# Patient Record
Sex: FEMALE | Race: WHITE | NOT HISPANIC OR LATINO | URBAN - METROPOLITAN AREA
[De-identification: names, ages, dates, MRNs, and addresses within clinical notes are randomized per-mention and may not be internally consistent; named-entity substitution may affect disease eponyms.]

---

## 2018-01-23 ENCOUNTER — INPATIENT (INPATIENT)
Facility: HOSPITAL | Age: 83
LOS: 5 days | Discharge: HOME | DRG: 871 | End: 2018-01-29
Attending: HOSPITALIST | Admitting: HOSPITALIST
Payer: MEDICARE

## 2018-01-23 VITALS
HEART RATE: 116 BPM | WEIGHT: 128.09 LBS | RESPIRATION RATE: 28 BRPM | TEMPERATURE: 100 F | DIASTOLIC BLOOD PRESSURE: 64 MMHG | SYSTOLIC BLOOD PRESSURE: 111 MMHG

## 2018-01-23 DIAGNOSIS — I10 ESSENTIAL (PRIMARY) HYPERTENSION: ICD-10-CM

## 2018-01-23 DIAGNOSIS — A41.9 SEPSIS, UNSPECIFIED ORGANISM: ICD-10-CM

## 2018-01-23 DIAGNOSIS — J18.1 LOBAR PNEUMONIA, UNSPECIFIED ORGANISM: ICD-10-CM

## 2018-01-23 DIAGNOSIS — Z29.9 ENCOUNTER FOR PROPHYLACTIC MEASURES, UNSPECIFIED: ICD-10-CM

## 2018-01-23 LAB
ALBUMIN SERPL ELPH-MCNC: 3.1 G/DL — LOW (ref 3.5–5)
ALP SERPL-CCNC: 83 U/L — SIGNIFICANT CHANGE UP (ref 40–120)
ALT FLD-CCNC: 15 U/L DA — SIGNIFICANT CHANGE UP (ref 10–60)
ANION GAP SERPL CALC-SCNC: 7 MMOL/L — SIGNIFICANT CHANGE UP (ref 5–17)
APPEARANCE UR: ABNORMAL
APTT BLD: 36 SEC — SIGNIFICANT CHANGE UP (ref 27.5–37.4)
AST SERPL-CCNC: 7 U/L — LOW (ref 10–40)
BASE EXCESS BLDV CALC-SCNC: 3.7 MMOL/L — HIGH (ref -2–2)
BILIRUB SERPL-MCNC: 1 MG/DL — SIGNIFICANT CHANGE UP (ref 0.2–1.2)
BILIRUB UR-MCNC: NEGATIVE — SIGNIFICANT CHANGE UP
BLOOD GAS COMMENTS, VENOUS: SIGNIFICANT CHANGE UP
BUN SERPL-MCNC: 19 MG/DL — HIGH (ref 7–18)
CALCIUM SERPL-MCNC: 9 MG/DL — SIGNIFICANT CHANGE UP (ref 8.4–10.5)
CHLORIDE SERPL-SCNC: 101 MMOL/L — SIGNIFICANT CHANGE UP (ref 96–108)
CO2 SERPL-SCNC: 28 MMOL/L — SIGNIFICANT CHANGE UP (ref 22–31)
COLOR SPEC: YELLOW — SIGNIFICANT CHANGE UP
CREAT SERPL-MCNC: 0.93 MG/DL — SIGNIFICANT CHANGE UP (ref 0.5–1.3)
DIFF PNL FLD: NEGATIVE — SIGNIFICANT CHANGE UP
GLUCOSE SERPL-MCNC: 162 MG/DL — HIGH (ref 70–99)
GLUCOSE UR QL: NEGATIVE — SIGNIFICANT CHANGE UP
HCO3 BLDV-SCNC: 29 MMOL/L — SIGNIFICANT CHANGE UP (ref 21–29)
HCT VFR BLD CALC: 38.5 % — SIGNIFICANT CHANGE UP (ref 34.5–45)
HGB BLD-MCNC: 11.6 G/DL — SIGNIFICANT CHANGE UP (ref 11.5–15.5)
HOROWITZ INDEX BLDV+IHG-RTO: 21 — SIGNIFICANT CHANGE UP
INR BLD: 1.42 RATIO — HIGH (ref 0.88–1.16)
KETONES UR-MCNC: ABNORMAL
LACTATE SERPL-SCNC: 1.2 MMOL/L — SIGNIFICANT CHANGE UP (ref 0.7–2)
LEUKOCYTE ESTERASE UR-ACNC: ABNORMAL
LYMPHOCYTES # BLD AUTO: 10 % — LOW (ref 13–44)
MCHC RBC-ENTMCNC: 26.9 PG — LOW (ref 27–34)
MCHC RBC-ENTMCNC: 30.3 GM/DL — LOW (ref 32–36)
MCV RBC AUTO: 88.8 FL — SIGNIFICANT CHANGE UP (ref 80–100)
MONOCYTES NFR BLD AUTO: 3 % — SIGNIFICANT CHANGE UP (ref 2–14)
NEUTROPHILS NFR BLD AUTO: 68 % — SIGNIFICANT CHANGE UP (ref 43–77)
NITRITE UR-MCNC: NEGATIVE — SIGNIFICANT CHANGE UP
PCO2 BLDV: 50 MMHG — SIGNIFICANT CHANGE UP (ref 35–50)
PH BLDV: 7.39 — SIGNIFICANT CHANGE UP (ref 7.35–7.45)
PH UR: 7 — SIGNIFICANT CHANGE UP (ref 5–8)
PLATELET # BLD AUTO: 300 K/UL — SIGNIFICANT CHANGE UP (ref 150–400)
PO2 BLDV: <44 MMHG — SIGNIFICANT CHANGE UP (ref 25–45)
POTASSIUM SERPL-MCNC: 3.6 MMOL/L — SIGNIFICANT CHANGE UP (ref 3.5–5.3)
POTASSIUM SERPL-SCNC: 3.6 MMOL/L — SIGNIFICANT CHANGE UP (ref 3.5–5.3)
PROT SERPL-MCNC: 7.2 G/DL — SIGNIFICANT CHANGE UP (ref 6–8.3)
PROT UR-MCNC: 15
PROTHROM AB SERPL-ACNC: 15.6 SEC — HIGH (ref 9.8–12.7)
RAPID RVP RESULT: SIGNIFICANT CHANGE UP
RBC # BLD: 4.34 M/UL — SIGNIFICANT CHANGE UP (ref 3.8–5.2)
RBC # FLD: 18.7 % — HIGH (ref 10.3–14.5)
SAO2 % BLDV: 50 % — LOW (ref 67–88)
SODIUM SERPL-SCNC: 136 MMOL/L — SIGNIFICANT CHANGE UP (ref 135–145)
SP GR SPEC: 1.01 — SIGNIFICANT CHANGE UP (ref 1.01–1.02)
UROBILINOGEN FLD QL: NEGATIVE — SIGNIFICANT CHANGE UP
WBC # BLD: 8.1 K/UL — SIGNIFICANT CHANGE UP (ref 3.8–10.5)
WBC # FLD AUTO: 8.1 K/UL — SIGNIFICANT CHANGE UP (ref 3.8–10.5)

## 2018-01-23 PROCEDURE — 99285 EMERGENCY DEPT VISIT HI MDM: CPT

## 2018-01-23 PROCEDURE — 99223 1ST HOSP IP/OBS HIGH 75: CPT | Mod: GC

## 2018-01-23 PROCEDURE — 71045 X-RAY EXAM CHEST 1 VIEW: CPT | Mod: 26

## 2018-01-23 RX ORDER — SODIUM CHLORIDE 9 MG/ML
3 INJECTION INTRAMUSCULAR; INTRAVENOUS; SUBCUTANEOUS ONCE
Qty: 0 | Refills: 0 | Status: COMPLETED | OUTPATIENT
Start: 2018-01-23 | End: 2018-01-23

## 2018-01-23 RX ORDER — ASPIRIN/CALCIUM CARB/MAGNESIUM 324 MG
81 TABLET ORAL DAILY
Qty: 0 | Refills: 0 | Status: DISCONTINUED | OUTPATIENT
Start: 2018-01-23 | End: 2018-01-29

## 2018-01-23 RX ORDER — ENOXAPARIN SODIUM 100 MG/ML
40 INJECTION SUBCUTANEOUS DAILY
Qty: 0 | Refills: 0 | Status: DISCONTINUED | OUTPATIENT
Start: 2018-01-23 | End: 2018-01-29

## 2018-01-23 RX ORDER — CEFTRIAXONE 500 MG/1
INJECTION, POWDER, FOR SOLUTION INTRAMUSCULAR; INTRAVENOUS
Qty: 0 | Refills: 0 | Status: DISCONTINUED | OUTPATIENT
Start: 2018-01-23 | End: 2018-01-29

## 2018-01-23 RX ORDER — HYDROXYUREA 500 MG/1
0 CAPSULE ORAL
Qty: 30 | Refills: 0 | COMMUNITY

## 2018-01-23 RX ORDER — IPRATROPIUM/ALBUTEROL SULFATE 18-103MCG
3 AEROSOL WITH ADAPTER (GRAM) INHALATION EVERY 6 HOURS
Qty: 0 | Refills: 0 | Status: DISCONTINUED | OUTPATIENT
Start: 2018-01-23 | End: 2018-01-29

## 2018-01-23 RX ORDER — VANCOMYCIN HCL 1 G
1000 VIAL (EA) INTRAVENOUS ONCE
Qty: 0 | Refills: 0 | Status: COMPLETED | OUTPATIENT
Start: 2018-01-23 | End: 2018-01-23

## 2018-01-23 RX ORDER — CEFTRIAXONE 500 MG/1
1 INJECTION, POWDER, FOR SOLUTION INTRAMUSCULAR; INTRAVENOUS EVERY 24 HOURS
Qty: 0 | Refills: 0 | Status: DISCONTINUED | OUTPATIENT
Start: 2018-01-24 | End: 2018-01-29

## 2018-01-23 RX ORDER — METOPROLOL TARTRATE 50 MG
0 TABLET ORAL
Qty: 0 | Refills: 0 | COMMUNITY

## 2018-01-23 RX ORDER — PIPERACILLIN AND TAZOBACTAM 4; .5 G/20ML; G/20ML
3.38 INJECTION, POWDER, LYOPHILIZED, FOR SOLUTION INTRAVENOUS ONCE
Qty: 0 | Refills: 0 | Status: COMPLETED | OUTPATIENT
Start: 2018-01-23 | End: 2018-01-23

## 2018-01-23 RX ORDER — AZITHROMYCIN 500 MG/1
TABLET, FILM COATED ORAL
Qty: 0 | Refills: 0 | Status: DISCONTINUED | OUTPATIENT
Start: 2018-01-23 | End: 2018-01-24

## 2018-01-23 RX ORDER — METOPROLOL TARTRATE 50 MG
0 TABLET ORAL
Qty: 15 | Refills: 0 | COMMUNITY

## 2018-01-23 RX ORDER — AZITHROMYCIN 500 MG/1
500 TABLET, FILM COATED ORAL ONCE
Qty: 0 | Refills: 0 | Status: COMPLETED | OUTPATIENT
Start: 2018-01-23 | End: 2018-01-23

## 2018-01-23 RX ORDER — ASPIRIN/CALCIUM CARB/MAGNESIUM 324 MG
1 TABLET ORAL
Qty: 0 | Refills: 0 | COMMUNITY

## 2018-01-23 RX ORDER — AZITHROMYCIN 500 MG/1
500 TABLET, FILM COATED ORAL EVERY 24 HOURS
Qty: 0 | Refills: 0 | Status: DISCONTINUED | OUTPATIENT
Start: 2018-01-24 | End: 2018-01-24

## 2018-01-23 RX ORDER — SODIUM CHLORIDE 9 MG/ML
1000 INJECTION INTRAMUSCULAR; INTRAVENOUS; SUBCUTANEOUS
Qty: 0 | Refills: 0 | Status: DISCONTINUED | OUTPATIENT
Start: 2018-01-23 | End: 2018-01-24

## 2018-01-23 RX ORDER — SODIUM CHLORIDE 9 MG/ML
500 INJECTION INTRAMUSCULAR; INTRAVENOUS; SUBCUTANEOUS
Qty: 0 | Refills: 0 | Status: COMPLETED | OUTPATIENT
Start: 2018-01-23 | End: 2018-01-23

## 2018-01-23 RX ORDER — CEFTRIAXONE 500 MG/1
1 INJECTION, POWDER, FOR SOLUTION INTRAMUSCULAR; INTRAVENOUS ONCE
Qty: 0 | Refills: 0 | Status: COMPLETED | OUTPATIENT
Start: 2018-01-23 | End: 2018-01-23

## 2018-01-23 RX ORDER — METOPROLOL TARTRATE 50 MG
0.5 TABLET ORAL
Qty: 0 | Refills: 0 | COMMUNITY

## 2018-01-23 RX ORDER — HYDROXYUREA 500 MG/1
500 CAPSULE ORAL DAILY
Qty: 0 | Refills: 0 | Status: DISCONTINUED | OUTPATIENT
Start: 2018-01-23 | End: 2018-01-29

## 2018-01-23 RX ORDER — HYDROXYUREA 500 MG/1
0 CAPSULE ORAL
Qty: 0 | Refills: 0 | COMMUNITY

## 2018-01-23 RX ORDER — ACETAMINOPHEN 500 MG
650 TABLET ORAL EVERY 6 HOURS
Qty: 0 | Refills: 0 | Status: DISCONTINUED | OUTPATIENT
Start: 2018-01-23 | End: 2018-01-29

## 2018-01-23 RX ADMIN — CEFTRIAXONE 200 GRAM(S): 500 INJECTION, POWDER, FOR SOLUTION INTRAMUSCULAR; INTRAVENOUS at 18:53

## 2018-01-23 RX ADMIN — SODIUM CHLORIDE 80 MILLILITER(S): 9 INJECTION INTRAMUSCULAR; INTRAVENOUS; SUBCUTANEOUS at 23:22

## 2018-01-23 RX ADMIN — Medication 3 MILLILITER(S): at 18:07

## 2018-01-23 RX ADMIN — SODIUM CHLORIDE 2000 MILLILITER(S): 9 INJECTION INTRAMUSCULAR; INTRAVENOUS; SUBCUTANEOUS at 11:45

## 2018-01-23 RX ADMIN — SODIUM CHLORIDE 3 MILLILITER(S): 9 INJECTION INTRAMUSCULAR; INTRAVENOUS; SUBCUTANEOUS at 12:02

## 2018-01-23 RX ADMIN — PIPERACILLIN AND TAZOBACTAM 200 GRAM(S): 4; .5 INJECTION, POWDER, LYOPHILIZED, FOR SOLUTION INTRAVENOUS at 12:45

## 2018-01-23 RX ADMIN — Medication 250 MILLIGRAM(S): at 13:09

## 2018-01-23 RX ADMIN — SODIUM CHLORIDE 2000 MILLILITER(S): 9 INJECTION INTRAMUSCULAR; INTRAVENOUS; SUBCUTANEOUS at 12:02

## 2018-01-23 RX ADMIN — SODIUM CHLORIDE 2000 MILLILITER(S): 9 INJECTION INTRAMUSCULAR; INTRAVENOUS; SUBCUTANEOUS at 11:30

## 2018-01-23 RX ADMIN — AZITHROMYCIN 250 MILLIGRAM(S): 500 TABLET, FILM COATED ORAL at 18:10

## 2018-01-23 RX ADMIN — Medication 100 MILLIGRAM(S): at 23:23

## 2018-01-23 RX ADMIN — Medication 3 MILLILITER(S): at 20:53

## 2018-01-23 RX ADMIN — SODIUM CHLORIDE 2000 MILLILITER(S): 9 INJECTION INTRAMUSCULAR; INTRAVENOUS; SUBCUTANEOUS at 11:35

## 2018-01-23 NOTE — H&P ADULT - NSHPREVIEWOFSYSTEMS_GEN_ALL_CORE
REVIEW OF SYSTEMS:    CONSTITUTIONAL: No weakness, fevers or chills  EYES/ENT: No visual changes;  No vertigo or throat pain   NECK: No pain or stiffness  RESPIRATORY: short of breath associated with cough and sputum production as mentioned as above.   CARDIOVASCULAR: No chest pain or palpitations  GASTROINTESTINAL: No abdominal or epigastric pain. No nausea, vomiting, or hematemesis; No diarrhea or constipation. No melena or hematochezia.  GENITOURINARY: No dysuria, frequency or hematuria  NEUROLOGICAL: No numbness or weakness  SKIN: No itching, rashes  No other complaint except mentioned as above.

## 2018-01-23 NOTE — H&P ADULT - NSHPPHYSICALEXAM_GEN_ALL_CORE
PHYSICAL EXAM:    GENERAL: NAD,     HEAD:  Atraumatic, Normocephalic    EYES: EOMI, PERRLA, conjunctiva and sclera clear    NECK: Supple, No JVD    CHEST/LUNG: Clear to auscultation bilaterally; No wheeze    HEART: Regular rate and rhythm; No murmurs, rubs, or gallops    ABDOMEN: Soft, Nontender, Nondistended; Bowel sounds present    EXTREMITIES:  2+ Peripheral Pulses, No clubbing, cyanosis, or edema    PSYCH: AAOx3    NEUROLOGY: non-focal    SKIN: No rashes or lesions    No other pertinent positive finding except mentioned as above.

## 2018-01-23 NOTE — ED PROVIDER NOTE - NS ED ROS FT
ROS: As noted in HPI, otherwise below --  Constitutional symptoms: +  Eyes: Negative  Ears, Nose, Mouth, Throat: Negative  Cardiovascular: Negative  Respiratory: +  Gastrointestinal: Negative  Genitourinary: Negative  Musculoskeletal: Negative  Integumentary: Negative  Neurological: Negative  Psychiatric: Negative  Endocrine: Negative  Allergic/Immunologic: Negative

## 2018-01-23 NOTE — H&P ADULT - NSHPLABSRESULTS_GEN_ALL_CORE
Vital Signs Last 24 Hrs  T(C): 37.9 (23 Jan 2018 10:46), Max: 37.9 (23 Jan 2018 10:46)  T(F): 100.3 (23 Jan 2018 10:46), Max: 100.3 (23 Jan 2018 10:46)  HR: 116 (23 Jan 2018 10:46) (116 - 116)  BP: 111/64 (23 Jan 2018 10:46) (111/64 - 111/64)  BP(mean): --  RR: 28 (23 Jan 2018 10:46) (28 - 28)  SpO2: --      Labs:                        11.6   8.1   )-----------( 300      ( 23 Jan 2018 12:07 )             38.5     01-23    136  |  101  |  19<H>  ----------------------------<  162<H>  3.6   |  28  |  0.93      < from: Xray Chest 1 View AP/PA (01.23.18 @ 11:23) >    FINDINGS:  Patchy and streaky right lower lung opacity concerning for pneumonia;   correlate for aspiration.    No pneumothorax or pleural effusion.   The cardiac silhouette is not accurately assessed by AP technique. Aortic   calcifications.  Since the prior study, there has been ORIF of the left proximal humerus.    IMPRESSION:  Patchy and streaky right lower lung opacity concerning for pneumonia;   correlate for aspiration.            < end of copied text >      Ca    9.0      23 Jan 2018 12:07    TPro  7.2  /  Alb  3.1<L>  /  TBili  1.0  /  DBili  x   /  AST  7<L>  /  ALT  15  /  AlkPhos  83  01-23

## 2018-01-23 NOTE — H&P ADULT - PROBLEM SELECTOR PLAN 3
Improve score 2. Age and immobility  Will give Lovenox for DVT PPx. holding metoprolol while patient is in sepsis  -restart upon discharge or sepsis resolved  -bp monitor

## 2018-01-23 NOTE — H&P ADULT - PROBLEM SELECTOR PLAN 2
-Rocephin, azithromycin  -Duoneb, Tylenol and Robitussin as mentioned above  -f/u sputum and blood culture

## 2018-01-23 NOTE — ED PROVIDER NOTE - PHYSICAL EXAMINATION
Gen: well appearing, of stated age, no acute distress, elderly; Head: NC, AT; ENT: MMM, no uvular deviation; Neck: supple with full ROM; Chest: CTAB w rales to RLL, no retractions, rate mid 20s, appears to breathe w increased effort; Heart: tachy S1S2 No JVD No peripheral edema b/l pulses 2+ in arms and legs; Abd: Soft non-tender, no rebound or guarding, no masses, no noel sign, no mcburney tenderness, no CVAT; Back: No spinal deformity; Ext: Moving all 4 limbs without obvious impairment to ROM, no obvious weakness; Neuro: fluid speech, no focal deficits, oriented to person, place, situation, translated by son; Psych: No anxiety, depression or pressured speech noted; Skin: no utricaria, no diffuse rash. -ncohen

## 2018-01-23 NOTE — H&P ADULT - ATTENDING COMMENTS
Agree with above   patient is seen and examined in ED. Patient's son was present during examination. She is a 82 year old female with PMH of HTN,  non smoker presented with short of breath for 5 days. Sob is associated with cough and expectoration of yellowish sputum without blood. Patient was recently admitted for fall and head injury and was in rehab,  Pt had temperature of 101 yesterday. Pt also vomited after persistent coughing. Denied palpitation, chest pain, and any other symptoms.   ROS and PE as above   Vital Signs Last 24 Hrs  T(C): 37.5 (23 Jan 2018 15:35), Max: 37.9 (23 Jan 2018 10:46)  T(F): 99.5 (23 Jan 2018 15:35), Max: 100.3 (23 Jan 2018 10:46)  HR: 109 (23 Jan 2018 15:35) (109 - 116)  BP: 94/72 (23 Jan 2018 15:35) (94/72 - 111/64)  BP(mean): --  RR: 26 (23 Jan 2018 15:35) (26 - 28)  SpO2: 96% (23 Jan 2018 15:35) (96% - 96%)    1. Sepsis secondary to right lower lung opacity, underlying viral infection  can not be excluded  Has low grade fever and bandemia  BC sent   CXR: RLL opacity   c/w Azithromycin and Ceftriaxone   DUONEB and Robitussin for cough    BC and RVP sent   IV hydration     2. PNA RLL   3. HTN: BP meds on hold secondary to borderline low BP     Plan of care discussed with patient son. Called patient PCP - Dr. Bernard and notified of patients status

## 2018-01-23 NOTE — H&P ADULT - PROBLEM SELECTOR PLAN 1
-met SIRS criteria (tachycardia and rapid RR)  -IV hydration, Rocephin and Azithromycin  -c/w Duoneb, Robitussin and tylenols  -f/u blood culture and sputum culture  -well score 1.5 with low risk, no need for CTA, low concern for PE  -lactate 1.2

## 2018-01-23 NOTE — ED PROVIDER NOTE - OBJECTIVE STATEMENT
82 female presenting with sob and cough x 5 days w subjective fever x 1 day. post tussive emesis as well. no cp or abdominal pain. Had a fall and was admitted to [facility] and then rehab, home from rehab less than 2 wks. also w recent pna. o2 sat 91% on ra

## 2018-01-23 NOTE — ED PROVIDER NOTE - CARE PLAN
Principal Discharge DX:	Pneumonia of right lower lobe due to infectious organism  Goal:	probably aspiration possibly strep pneumo

## 2018-01-23 NOTE — ED ADULT NURSE NOTE - OBJECTIVE STATEMENT
Pt c/o flu like symptoms, SOB, cough, body aches, fever  Stated has been vomiting and has no appetite

## 2018-01-23 NOTE — H&P ADULT - HISTORY OF PRESENT ILLNESS
82 year old female with PMH of HTN non smoker presented with short of breath for 5 days. Sob is associated with cough and expectoration of yellowish sputum without blood. Patient was recently admitted for fall and head injury and was in rehab but pt is very mobile in rehab as per son and denied chest pain. Pt has temperature of 101 yesterday. Pt also vomited after persistent coughing. Denied palpitation, chest pain, and any other symptoms.   CXR with Right lower lobe infiltrate. Fever 100.3 and tachycardia 116.

## 2018-01-24 LAB
24R-OH-CALCIDIOL SERPL-MCNC: 21.9 NG/ML — LOW (ref 30–80)
ANION GAP SERPL CALC-SCNC: 5 MMOL/L — SIGNIFICANT CHANGE UP (ref 5–17)
BUN SERPL-MCNC: 16 MG/DL — SIGNIFICANT CHANGE UP (ref 7–18)
CALCIUM SERPL-MCNC: 8.3 MG/DL — LOW (ref 8.4–10.5)
CHLORIDE SERPL-SCNC: 104 MMOL/L — SIGNIFICANT CHANGE UP (ref 96–108)
CHOLEST SERPL-MCNC: 123 MG/DL — SIGNIFICANT CHANGE UP (ref 10–199)
CO2 SERPL-SCNC: 29 MMOL/L — SIGNIFICANT CHANGE UP (ref 22–31)
CREAT SERPL-MCNC: 0.77 MG/DL — SIGNIFICANT CHANGE UP (ref 0.5–1.3)
GLUCOSE SERPL-MCNC: 112 MG/DL — HIGH (ref 70–99)
HBA1C BLD-MCNC: 5.4 % — SIGNIFICANT CHANGE UP (ref 4–5.6)
HCT VFR BLD CALC: 30.8 % — LOW (ref 34.5–45)
HDLC SERPL-MCNC: 40 MG/DL — SIGNIFICANT CHANGE UP (ref 40–125)
HGB BLD-MCNC: 9.8 G/DL — LOW (ref 11.5–15.5)
LIPID PNL WITH DIRECT LDL SERPL: 71 MG/DL — SIGNIFICANT CHANGE UP
MAGNESIUM SERPL-MCNC: 2.1 MG/DL — SIGNIFICANT CHANGE UP (ref 1.6–2.6)
MCHC RBC-ENTMCNC: 28.7 PG — SIGNIFICANT CHANGE UP (ref 27–34)
MCHC RBC-ENTMCNC: 31.6 GM/DL — LOW (ref 32–36)
MCV RBC AUTO: 90.6 FL — SIGNIFICANT CHANGE UP (ref 80–100)
PHOSPHATE SERPL-MCNC: 2.6 MG/DL — SIGNIFICANT CHANGE UP (ref 2.5–4.5)
PLATELET # BLD AUTO: 230 K/UL — SIGNIFICANT CHANGE UP (ref 150–400)
POTASSIUM SERPL-MCNC: 3.4 MMOL/L — LOW (ref 3.5–5.3)
POTASSIUM SERPL-SCNC: 3.4 MMOL/L — LOW (ref 3.5–5.3)
RBC # BLD: 3.4 M/UL — LOW (ref 3.8–5.2)
RBC # FLD: 18.8 % — HIGH (ref 10.3–14.5)
SODIUM SERPL-SCNC: 138 MMOL/L — SIGNIFICANT CHANGE UP (ref 135–145)
TOTAL CHOLESTEROL/HDL RATIO MEASUREMENT: 3.1 RATIO — LOW (ref 3.3–7.1)
TRIGL SERPL-MCNC: 61 MG/DL — SIGNIFICANT CHANGE UP (ref 10–149)
TSH SERPL-MCNC: 0.96 UU/ML — SIGNIFICANT CHANGE UP (ref 0.34–4.82)
WBC # BLD: 7.1 K/UL — SIGNIFICANT CHANGE UP (ref 3.8–10.5)
WBC # FLD AUTO: 7.1 K/UL — SIGNIFICANT CHANGE UP (ref 3.8–10.5)

## 2018-01-24 PROCEDURE — 99233 SBSQ HOSP IP/OBS HIGH 50: CPT | Mod: GC

## 2018-01-24 PROCEDURE — 71045 X-RAY EXAM CHEST 1 VIEW: CPT | Mod: 26

## 2018-01-24 RX ORDER — AZITHROMYCIN 500 MG/1
500 TABLET, FILM COATED ORAL EVERY 24 HOURS
Qty: 0 | Refills: 0 | Status: DISCONTINUED | OUTPATIENT
Start: 2018-01-25 | End: 2018-01-29

## 2018-01-24 RX ORDER — AZITHROMYCIN 500 MG/1
500 TABLET, FILM COATED ORAL ONCE
Qty: 0 | Refills: 0 | Status: COMPLETED | OUTPATIENT
Start: 2018-01-24 | End: 2018-01-24

## 2018-01-24 RX ORDER — METRONIDAZOLE 500 MG
500 TABLET ORAL EVERY 8 HOURS
Qty: 0 | Refills: 0 | Status: DISCONTINUED | OUTPATIENT
Start: 2018-01-24 | End: 2018-01-29

## 2018-01-24 RX ORDER — METRONIDAZOLE 500 MG
500 TABLET ORAL ONCE
Qty: 0 | Refills: 0 | Status: COMPLETED | OUTPATIENT
Start: 2018-01-24 | End: 2018-01-24

## 2018-01-24 RX ORDER — POTASSIUM CHLORIDE 20 MEQ
20 PACKET (EA) ORAL ONCE
Qty: 0 | Refills: 0 | Status: COMPLETED | OUTPATIENT
Start: 2018-01-24 | End: 2018-01-24

## 2018-01-24 RX ORDER — SODIUM CHLORIDE 9 MG/ML
1000 INJECTION INTRAMUSCULAR; INTRAVENOUS; SUBCUTANEOUS
Qty: 0 | Refills: 0 | Status: DISCONTINUED | OUTPATIENT
Start: 2018-01-24 | End: 2018-01-29

## 2018-01-24 RX ORDER — METRONIDAZOLE 500 MG
TABLET ORAL
Qty: 0 | Refills: 0 | Status: DISCONTINUED | OUTPATIENT
Start: 2018-01-24 | End: 2018-01-29

## 2018-01-24 RX ORDER — AZITHROMYCIN 500 MG/1
TABLET, FILM COATED ORAL
Qty: 0 | Refills: 0 | Status: DISCONTINUED | OUTPATIENT
Start: 2018-01-24 | End: 2018-01-29

## 2018-01-24 RX ADMIN — Medication 40 MILLIGRAM(S): at 15:26

## 2018-01-24 RX ADMIN — Medication 3 MILLILITER(S): at 20:11

## 2018-01-24 RX ADMIN — Medication 100 MILLIGRAM(S): at 21:30

## 2018-01-24 RX ADMIN — SODIUM CHLORIDE 80 MILLILITER(S): 9 INJECTION INTRAMUSCULAR; INTRAVENOUS; SUBCUTANEOUS at 12:55

## 2018-01-24 RX ADMIN — Medication 100 MILLIGRAM(S): at 15:26

## 2018-01-24 RX ADMIN — ENOXAPARIN SODIUM 40 MILLIGRAM(S): 100 INJECTION SUBCUTANEOUS at 12:53

## 2018-01-24 RX ADMIN — Medication 81 MILLIGRAM(S): at 15:26

## 2018-01-24 RX ADMIN — CEFTRIAXONE 100 GRAM(S): 500 INJECTION, POWDER, FOR SOLUTION INTRAMUSCULAR; INTRAVENOUS at 12:55

## 2018-01-24 RX ADMIN — Medication 3 MILLILITER(S): at 15:27

## 2018-01-24 RX ADMIN — AZITHROMYCIN 250 MILLIGRAM(S): 500 TABLET, FILM COATED ORAL at 19:55

## 2018-01-24 RX ADMIN — HYDROXYUREA 500 MILLIGRAM(S): 500 CAPSULE ORAL at 15:26

## 2018-01-24 RX ADMIN — Medication 40 MILLIGRAM(S): at 21:58

## 2018-01-24 RX ADMIN — Medication 20 MILLIEQUIVALENT(S): at 12:53

## 2018-01-24 NOTE — PROGRESS NOTE ADULT - SUBJECTIVE AND OBJECTIVE BOX
Patient is a 82y old  Female who presents with a chief complaint of short of breath with cough (2018 14:05)    INTERVAL HPI / OVERNIGHT EVENTS:    ·	complains of persistent cough over the past 2 weeks, clear sputum  ·	complains of subjective fever    T(C): 37.6 (18 @ 05:15), Max: 37.6 (18 @ 23:00)  HR: 100 (18 @ 05:15) (100 - 112)  BP: 107/48 (18 @ 05:15) (91/48 - 107/48)  RR: 16 (18 @ 05:15) (16 - 26)  SpO2: 98% (18 @ 05:15) (94% - 98%)  I&O's Summary      LABS:                        9.8    7.1   )-----------( 230      ( 2018 07:46 )             30.8         138  |  104  |  16  ----------------------------<  112<H>  3.4<L>   |  29  |  0.77    Ca    8.3<L>      2018 07:46  Phos  2.6       Mg     2.1         TPro  7.2  /  Alb  3.1<L>  /  TBili  1.0  /  DBili  x   /  AST  7<L>  /  ALT  15  /  AlkPhos  83      PT/INR - ( 2018 12:07 )   PT: 15.6 sec;   INR: 1.42 ratio         PTT - ( 2018 12:07 )  PTT:36.0 sec  Urinalysis Basic - ( 2018 21:49 )    Color: Yellow / Appearance: Slightly Turbid / S.015 / pH: x  Gluc: x / Ketone: Large  / Bili: Negative / Urobili: Negative   Blood: x / Protein: 15 / Nitrite: Negative   Leuk Esterase: Trace / RBC: 0-2 /HPF / WBC 11-25 /HPF   Sq Epi: x / Non Sq Epi: Many /HPF / Bacteria: Moderate /HPF      Urinalysis Basic - ( 2018 21:49 )    Color: Yellow / Appearance: Slightly Turbid / S.015 / pH: x  Gluc: x / Ketone: Large  / Bili: Negative / Urobili: Negative   Blood: x / Protein: 15 / Nitrite: Negative   Leuk Esterase: Trace / RBC: 0-2 /HPF / WBC 11-25 /HPF   Sq Epi: x / Non Sq Epi: Many /HPF / Bacteria: Moderate /HPF    PHYSICAL EXAM:  GENERAL: NAD, well-groomed, well-developed  HEAD:  Atraumatic, Normocephalic  EYES: EOMI, PERRLA, conjunctiva and sclera clear  ENMT: No tonsillar erythema, exudates, or enlargement; Moist mucous membranes, Good dentition, No lesions  NECK: Supple, No JVD, Normal thyroid  NERVOUS SYSTEM:  Alert & Oriented X 1 (person)  CHEST/LUNG: bilateral inspiratory wheezing   HEART: Regular rate and rhythm; No murmurs, rubs, or gallops  ABDOMEN: Soft, Nontender, Nondistended; Bowel sounds present  EXTREMITIES:  2+ Peripheral Pulses, No clubbing, cyanosis, or edema  SKIN: No rashes or lesions    Care Discussed with Consultants/Other Providers [x] YES  [ ] NO

## 2018-01-24 NOTE — PROGRESS NOTE ADULT - ATTENDING COMMENTS
Patient was seen and examined at bedside with the resident, and using help of above Vietnamese ,  Patient complaints of cough, productive of clear sputum since a week, asking for help to ease the cough.  Physical exam:  vitals: tachycardia  HEENT: Neck supple  Heart: S1 S2 normal  Abdomen: NT, ND  Chest: Bilateral wheeze appreciated and prolonged expiration  LE: No LE edema  A/P  1- Sepsis secondary to pneumonia with elevated heart rate, tachypnea, low blood pressure, positive source of infection.  has low grade temperature,   continue with Rocephin and flagyl.  add azithromycin.  cough medicine  supportive care.  ? aspiration but as per nursing staff no problem with swallowing, will still cover for aspiration pneumonia  2- Hypotension: agree to hold BP meds for now.   3- asthma exacerbation secondary to infection  continue with duonebs and solumedrol  continue to monitor.   rest as above.

## 2018-01-24 NOTE — PATIENT PROFILE ADULT. - NS TRANSFER PATIENT BELONGINGS
Clothing Other belongings/Clothing/black and white sweater, 2 print shirts, 1 pair of yellow panties, 1 black shirt.  1 white pair of shoes

## 2018-01-25 LAB
ALBUMIN SERPL ELPH-MCNC: 2.7 G/DL — LOW (ref 3.5–5)
ALP SERPL-CCNC: 91 U/L — SIGNIFICANT CHANGE UP (ref 40–120)
ALT FLD-CCNC: 14 U/L DA — SIGNIFICANT CHANGE UP (ref 10–60)
ANION GAP SERPL CALC-SCNC: 7 MMOL/L — SIGNIFICANT CHANGE UP (ref 5–17)
AST SERPL-CCNC: 7 U/L — LOW (ref 10–40)
BASOPHILS # BLD AUTO: 0 K/UL — SIGNIFICANT CHANGE UP (ref 0–0.2)
BASOPHILS NFR BLD AUTO: 0.3 % — SIGNIFICANT CHANGE UP (ref 0–2)
BILIRUB SERPL-MCNC: 0.3 MG/DL — SIGNIFICANT CHANGE UP (ref 0.2–1.2)
BUN SERPL-MCNC: 18 MG/DL — SIGNIFICANT CHANGE UP (ref 7–18)
CALCIUM SERPL-MCNC: 9.1 MG/DL — SIGNIFICANT CHANGE UP (ref 8.4–10.5)
CHLORIDE SERPL-SCNC: 107 MMOL/L — SIGNIFICANT CHANGE UP (ref 96–108)
CO2 SERPL-SCNC: 26 MMOL/L — SIGNIFICANT CHANGE UP (ref 22–31)
CREAT SERPL-MCNC: 0.61 MG/DL — SIGNIFICANT CHANGE UP (ref 0.5–1.3)
EOSINOPHIL # BLD AUTO: 0 K/UL — SIGNIFICANT CHANGE UP (ref 0–0.5)
EOSINOPHIL NFR BLD AUTO: 0 % — SIGNIFICANT CHANGE UP (ref 0–6)
GLUCOSE SERPL-MCNC: 173 MG/DL — HIGH (ref 70–99)
HCT VFR BLD CALC: 37.1 % — SIGNIFICANT CHANGE UP (ref 34.5–45)
HGB BLD-MCNC: 11.3 G/DL — LOW (ref 11.5–15.5)
LYMPHOCYTES # BLD AUTO: 0.4 K/UL — LOW (ref 1–3.3)
LYMPHOCYTES # BLD AUTO: 5.6 % — LOW (ref 13–44)
MAGNESIUM SERPL-MCNC: 2.3 MG/DL — SIGNIFICANT CHANGE UP (ref 1.6–2.6)
MCHC RBC-ENTMCNC: 27.4 PG — SIGNIFICANT CHANGE UP (ref 27–34)
MCHC RBC-ENTMCNC: 30.6 GM/DL — LOW (ref 32–36)
MCV RBC AUTO: 89.4 FL — SIGNIFICANT CHANGE UP (ref 80–100)
MONOCYTES # BLD AUTO: 0.1 K/UL — SIGNIFICANT CHANGE UP (ref 0–0.9)
MONOCYTES NFR BLD AUTO: 1.9 % — LOW (ref 2–14)
NEUTROPHILS # BLD AUTO: 6.9 K/UL — SIGNIFICANT CHANGE UP (ref 1.8–7.4)
NEUTROPHILS NFR BLD AUTO: 92.2 % — HIGH (ref 43–77)
PHOSPHATE SERPL-MCNC: 2.8 MG/DL — SIGNIFICANT CHANGE UP (ref 2.5–4.5)
PLATELET # BLD AUTO: 373 K/UL — SIGNIFICANT CHANGE UP (ref 150–400)
POTASSIUM SERPL-MCNC: 4.2 MMOL/L — SIGNIFICANT CHANGE UP (ref 3.5–5.3)
POTASSIUM SERPL-SCNC: 4.2 MMOL/L — SIGNIFICANT CHANGE UP (ref 3.5–5.3)
PROT SERPL-MCNC: 7 G/DL — SIGNIFICANT CHANGE UP (ref 6–8.3)
RBC # BLD: 4.15 M/UL — SIGNIFICANT CHANGE UP (ref 3.8–5.2)
RBC # FLD: 19 % — HIGH (ref 10.3–14.5)
SODIUM SERPL-SCNC: 140 MMOL/L — SIGNIFICANT CHANGE UP (ref 135–145)
WBC # BLD: 7.5 K/UL — SIGNIFICANT CHANGE UP (ref 3.8–10.5)
WBC # FLD AUTO: 7.5 K/UL — SIGNIFICANT CHANGE UP (ref 3.8–10.5)

## 2018-01-25 PROCEDURE — 99233 SBSQ HOSP IP/OBS HIGH 50: CPT | Mod: GC

## 2018-01-25 RX ORDER — BUDESONIDE AND FORMOTEROL FUMARATE DIHYDRATE 160; 4.5 UG/1; UG/1
2 AEROSOL RESPIRATORY (INHALATION)
Qty: 0 | Refills: 0 | Status: DISCONTINUED | OUTPATIENT
Start: 2018-01-25 | End: 2018-01-29

## 2018-01-25 RX ADMIN — CEFTRIAXONE 100 GRAM(S): 500 INJECTION, POWDER, FOR SOLUTION INTRAMUSCULAR; INTRAVENOUS at 13:39

## 2018-01-25 RX ADMIN — HYDROXYUREA 500 MILLIGRAM(S): 500 CAPSULE ORAL at 11:53

## 2018-01-25 RX ADMIN — ENOXAPARIN SODIUM 40 MILLIGRAM(S): 100 INJECTION SUBCUTANEOUS at 11:53

## 2018-01-25 RX ADMIN — Medication 40 MILLIGRAM(S): at 13:46

## 2018-01-25 RX ADMIN — Medication 81 MILLIGRAM(S): at 11:53

## 2018-01-25 RX ADMIN — Medication 100 MILLIGRAM(S): at 13:39

## 2018-01-25 RX ADMIN — Medication 100 MILLIGRAM(S): at 05:12

## 2018-01-25 RX ADMIN — Medication 3 MILLILITER(S): at 09:48

## 2018-01-25 RX ADMIN — Medication 3 MILLILITER(S): at 20:13

## 2018-01-25 RX ADMIN — Medication 40 MILLIGRAM(S): at 21:52

## 2018-01-25 RX ADMIN — Medication 100 MILLIGRAM(S): at 15:28

## 2018-01-25 RX ADMIN — Medication 100 MILLIGRAM(S): at 21:56

## 2018-01-25 RX ADMIN — Medication 40 MILLIGRAM(S): at 05:12

## 2018-01-25 RX ADMIN — Medication 3 MILLILITER(S): at 14:21

## 2018-01-25 RX ADMIN — BUDESONIDE AND FORMOTEROL FUMARATE DIHYDRATE 2 PUFF(S): 160; 4.5 AEROSOL RESPIRATORY (INHALATION) at 21:53

## 2018-01-25 RX ADMIN — AZITHROMYCIN 250 MILLIGRAM(S): 500 TABLET, FILM COATED ORAL at 17:23

## 2018-01-25 RX ADMIN — Medication 100 MILLIGRAM(S): at 21:53

## 2018-01-25 NOTE — PROGRESS NOTE ADULT - SUBJECTIVE AND OBJECTIVE BOX
Patient is a 82y old  Female who presents with a chief complaint of short of breath with cough (2018 14:05)    INTERVAL HPI / OVERNIGHT EVENTS:    ·	complains of persistent productive cough  ·	bilateral inspiratory wheezing on exam     T(C): 36.6 (18 @ 05:08), Max: 37 (18 @ 13:35)  HR: 89 (18 @ 05:08) (89 - 100)  BP: 112/58 (18 @ 05:08) (102/54 - 122/60)  RR: 16 (18 @ 05:08) (16 - 17)  SpO2: 95% (18 @ 05:08) (95% - 98%)  I&O's Summary      LABS:                        11.3   7.5   )-----------( 373      ( 2018 08:59 )             37.1         140  |  107  |  18  ----------------------------<  173<H>  4.2   |  26  |  0.61    Ca    9.1      2018 08:59  Phos  2.8       Mg     2.3         TPro  7.0  /  Alb  2.7<L>  /  TBili  0.3  /  DBili  x   /  AST  7<L>  /  ALT  14  /  AlkPhos  91      PT/INR - ( 2018 12:07 )   PT: 15.6 sec;   INR: 1.42 ratio         PTT - ( 2018 12:07 )  PTT:36.0 sec  Urinalysis Basic - ( 2018 21:49 )    Color: Yellow / Appearance: Slightly Turbid / S.015 / pH: x  Gluc: x / Ketone: Large  / Bili: Negative / Urobili: Negative   Blood: x / Protein: 15 / Nitrite: Negative   Leuk Esterase: Trace / RBC: 0-2 /HPF / WBC 11-25 /HPF   Sq Epi: x / Non Sq Epi: Many /HPF / Bacteria: Moderate /HPF    Urinalysis Basic - ( 2018 21:49 )    Color: Yellow / Appearance: Slightly Turbid / S.015 / pH: x  Gluc: x / Ketone: Large  / Bili: Negative / Urobili: Negative   Blood: x / Protein: 15 / Nitrite: Negative   Leuk Esterase: Trace / RBC: 0-2 /HPF / WBC 11-25 /HPF   Sq Epi: x / Non Sq Epi: Many /HPF / Bacteria: Moderate /HPF      PHYSICAL EXAM:  GENERAL: NAD, well-groomed, well-developed  HEAD:  Atraumatic, Normocephalic  EYES: EOMI, PERRLA, conjunctiva and sclera clear  ENMT: No tonsillar erythema, exudates, or enlargement; Moist mucous membranes, Good dentition, No lesions  NECK: Supple, No JVD, Normal thyroid  NERVOUS SYSTEM:  Alert & Oriented X 1 (person)   CHEST/LUNG: bilateral inspiratory wheezing   HEART: Regular rate and rhythm; No murmurs, rubs, or gallops  ABDOMEN: Soft, Nontender, Nondistended; Bowel sounds present  EXTREMITIES:  2+ Peripheral Pulses, No clubbing, cyanosis, or edema  SKIN: No rashes or lesions    Care Discussed with Consultants/Other Providers [x] YES  [ ] NO

## 2018-01-25 NOTE — PROGRESS NOTE ADULT - ATTENDING COMMENTS
Patient was seen and examined at bedside, agree with above findings and plan of care as discussed with the resident.  Patient looks better and feels better, her cough is improving, however she has audible wheeze at bedside.   Physical exam:   Vitals:   no fever overnight, BP better.  HEENT: Neck supple  heart: S1 S2 normal  Abdomen: NT, ND  Chest: bilateral wheeze appreciated, with scanty rhonchi on the right middle lobe.   LE: No LE edema     A/P  1- Sepsis: secondary to Pneumonia  BP is better, HR better and no fevers overnight.   continues to be on nasal oxygen, will check tomorrow for ambulation off oxygen.   Continue azithromycin, Rocephin. Flagyl added to cover anaerobes as chest xray finding suspicious for possible aspiration.     2- Asthma exacerbation:   secondary to infection,   continue with solumedrol due to active wheezing,   add symbicort to give prolonged effect next to the short acting duonebs.     3- Hypertension: medications are held in light of low BP on admission.     rest as above.

## 2018-01-26 ENCOUNTER — TRANSCRIPTION ENCOUNTER (OUTPATIENT)
Age: 83
End: 2018-01-26

## 2018-01-26 LAB
ALBUMIN SERPL ELPH-MCNC: 2.3 G/DL — LOW (ref 3.5–5)
ALP SERPL-CCNC: 72 U/L — SIGNIFICANT CHANGE UP (ref 40–120)
ALT FLD-CCNC: 9 U/L DA — LOW (ref 10–60)
ANION GAP SERPL CALC-SCNC: 6 MMOL/L — SIGNIFICANT CHANGE UP (ref 5–17)
AST SERPL-CCNC: 8 U/L — LOW (ref 10–40)
BASOPHILS # BLD AUTO: 0 K/UL — SIGNIFICANT CHANGE UP (ref 0–0.2)
BASOPHILS NFR BLD AUTO: 0.2 % — SIGNIFICANT CHANGE UP (ref 0–2)
BILIRUB SERPL-MCNC: 0.2 MG/DL — SIGNIFICANT CHANGE UP (ref 0.2–1.2)
BUN SERPL-MCNC: 23 MG/DL — HIGH (ref 7–18)
CALCIUM SERPL-MCNC: 8.7 MG/DL — SIGNIFICANT CHANGE UP (ref 8.4–10.5)
CHLORIDE SERPL-SCNC: 109 MMOL/L — HIGH (ref 96–108)
CK MB BLD-MCNC: 4.6 % — HIGH (ref 0–3.5)
CK MB CFR SERPL CALC: 1.6 NG/ML — SIGNIFICANT CHANGE UP (ref 0–3.6)
CK SERPL-CCNC: 35 U/L — SIGNIFICANT CHANGE UP (ref 21–215)
CO2 SERPL-SCNC: 29 MMOL/L — SIGNIFICANT CHANGE UP (ref 22–31)
CREAT SERPL-MCNC: 0.56 MG/DL — SIGNIFICANT CHANGE UP (ref 0.5–1.3)
D DIMER BLD IA.RAPID-MCNC: 403 NG/ML DDU — HIGH
EOSINOPHIL # BLD AUTO: 0 K/UL — SIGNIFICANT CHANGE UP (ref 0–0.5)
EOSINOPHIL NFR BLD AUTO: 0 % — SIGNIFICANT CHANGE UP (ref 0–6)
GLUCOSE SERPL-MCNC: 169 MG/DL — HIGH (ref 70–99)
HCT VFR BLD CALC: 32.5 % — LOW (ref 34.5–45)
HGB BLD-MCNC: 10.1 G/DL — LOW (ref 11.5–15.5)
LYMPHOCYTES # BLD AUTO: 0.4 K/UL — LOW (ref 1–3.3)
LYMPHOCYTES # BLD AUTO: 5.7 % — LOW (ref 13–44)
MAGNESIUM SERPL-MCNC: 2.1 MG/DL — SIGNIFICANT CHANGE UP (ref 1.6–2.6)
MCHC RBC-ENTMCNC: 28.1 PG — SIGNIFICANT CHANGE UP (ref 27–34)
MCHC RBC-ENTMCNC: 30.9 GM/DL — LOW (ref 32–36)
MCV RBC AUTO: 90.9 FL — SIGNIFICANT CHANGE UP (ref 80–100)
MONOCYTES # BLD AUTO: 0.2 K/UL — SIGNIFICANT CHANGE UP (ref 0–0.9)
MONOCYTES NFR BLD AUTO: 2.9 % — SIGNIFICANT CHANGE UP (ref 2–14)
NEUTROPHILS # BLD AUTO: 6 K/UL — SIGNIFICANT CHANGE UP (ref 1.8–7.4)
NEUTROPHILS NFR BLD AUTO: 91.2 % — HIGH (ref 43–77)
PHOSPHATE SERPL-MCNC: 2.5 MG/DL — SIGNIFICANT CHANGE UP (ref 2.5–4.5)
PLATELET # BLD AUTO: 343 K/UL — SIGNIFICANT CHANGE UP (ref 150–400)
POTASSIUM SERPL-MCNC: 3.9 MMOL/L — SIGNIFICANT CHANGE UP (ref 3.5–5.3)
POTASSIUM SERPL-SCNC: 3.9 MMOL/L — SIGNIFICANT CHANGE UP (ref 3.5–5.3)
PROT SERPL-MCNC: 5.8 G/DL — LOW (ref 6–8.3)
RBC # BLD: 3.58 M/UL — LOW (ref 3.8–5.2)
RBC # FLD: 18.9 % — HIGH (ref 10.3–14.5)
SODIUM SERPL-SCNC: 144 MMOL/L — SIGNIFICANT CHANGE UP (ref 135–145)
TROPONIN I SERPL-MCNC: 0.02 NG/ML — SIGNIFICANT CHANGE UP (ref 0–0.04)
WBC # BLD: 6.6 K/UL — SIGNIFICANT CHANGE UP (ref 3.8–10.5)
WBC # FLD AUTO: 6.6 K/UL — SIGNIFICANT CHANGE UP (ref 3.8–10.5)

## 2018-01-26 PROCEDURE — 99233 SBSQ HOSP IP/OBS HIGH 50: CPT | Mod: GC

## 2018-01-26 PROCEDURE — 93010 ELECTROCARDIOGRAM REPORT: CPT

## 2018-01-26 RX ORDER — BUDESONIDE AND FORMOTEROL FUMARATE DIHYDRATE 160; 4.5 UG/1; UG/1
2 AEROSOL RESPIRATORY (INHALATION)
Qty: 1 | Refills: 0 | OUTPATIENT
Start: 2018-01-26 | End: 2018-02-24

## 2018-01-26 RX ORDER — CEPHALEXIN 500 MG
1 CAPSULE ORAL
Qty: 10 | Refills: 0 | OUTPATIENT
Start: 2018-01-26 | End: 2018-01-30

## 2018-01-26 RX ORDER — FLUTICASONE PROPIONATE AND SALMETEROL 50; 250 UG/1; UG/1
2 POWDER ORAL; RESPIRATORY (INHALATION)
Qty: 1 | Refills: 0 | OUTPATIENT
Start: 2018-01-26 | End: 2018-02-24

## 2018-01-26 RX ADMIN — Medication 100 MILLIGRAM(S): at 13:40

## 2018-01-26 RX ADMIN — HYDROXYUREA 500 MILLIGRAM(S): 500 CAPSULE ORAL at 12:12

## 2018-01-26 RX ADMIN — Medication 40 MILLIGRAM(S): at 17:06

## 2018-01-26 RX ADMIN — Medication 3 MILLILITER(S): at 02:46

## 2018-01-26 RX ADMIN — Medication 81 MILLIGRAM(S): at 12:12

## 2018-01-26 RX ADMIN — BUDESONIDE AND FORMOTEROL FUMARATE DIHYDRATE 2 PUFF(S): 160; 4.5 AEROSOL RESPIRATORY (INHALATION) at 22:28

## 2018-01-26 RX ADMIN — ENOXAPARIN SODIUM 40 MILLIGRAM(S): 100 INJECTION SUBCUTANEOUS at 12:12

## 2018-01-26 RX ADMIN — Medication 40 MILLIGRAM(S): at 05:08

## 2018-01-26 RX ADMIN — Medication 100 MILLIGRAM(S): at 05:08

## 2018-01-26 RX ADMIN — Medication 100 MILLIGRAM(S): at 22:28

## 2018-01-26 RX ADMIN — Medication 3 MILLILITER(S): at 20:09

## 2018-01-26 RX ADMIN — AZITHROMYCIN 250 MILLIGRAM(S): 500 TABLET, FILM COATED ORAL at 17:06

## 2018-01-26 RX ADMIN — CEFTRIAXONE 100 GRAM(S): 500 INJECTION, POWDER, FOR SOLUTION INTRAMUSCULAR; INTRAVENOUS at 13:39

## 2018-01-26 RX ADMIN — Medication 3 MILLILITER(S): at 09:30

## 2018-01-26 RX ADMIN — BUDESONIDE AND FORMOTEROL FUMARATE DIHYDRATE 2 PUFF(S): 160; 4.5 AEROSOL RESPIRATORY (INHALATION) at 12:13

## 2018-01-26 RX ADMIN — Medication 100 MILLIGRAM(S): at 12:12

## 2018-01-26 RX ADMIN — Medication 3 MILLILITER(S): at 15:10

## 2018-01-26 NOTE — PHYSICAL THERAPY INITIAL EVALUATION ADULT - GAIT DEVIATIONS NOTED, PT EVAL
decreased chris/decreased step length/increased time in double stance/decreased stride length/decreased weight-shifting ability

## 2018-01-26 NOTE — DISCHARGE NOTE ADULT - PLAN OF CARE
resolve infection you were treated for aspiration pneumonia and were evaluated by a speech and swallow therapist who recommended to continue with soft diet with thin liquids  we recommend to complete your antibiotic course at home with Ceftin 500mg 2 times a day for 5 more days  we also recommend to continue with steroid isaiah at home as indicated above  continue with inhalers as instructed above and recommend to follow up with primary care doctor in 1 week and will need pulmonary function test as outpatient continue with home medications as instructed above

## 2018-01-26 NOTE — SWALLOW BEDSIDE ASSESSMENT ADULT - ASR SWALLOW ASPIRATION MONITOR
pneumonia/throat clearing/upper respiratory infection/change of breathing pattern/cough/fever/gurgly voice/oral hygiene/position upright (89Y)

## 2018-01-26 NOTE — SWALLOW BEDSIDE ASSESSMENT ADULT - SLP PERTINENT HISTORY OF CURRENT PROBLEM
81 yo female with PMH of HTN non smoker presented with short of breath for 5 days. Sob is associated with cough and expectoration of yellowish sputum without blood. Patient was recently admitted for fall and head injury and was in rehab but pt is very mobile in rehab as per son and denied chest pain.

## 2018-01-26 NOTE — SWALLOW BEDSIDE ASSESSMENT ADULT - SWALLOW EVAL: DIAGNOSIS
Pt suspected oropharyngeal dysphagia. Pt exhibited reduced oral grading. Delayed oral transit & a-p transport. Oral residue post swallow w/bread but cleared w/sips of thin liquids. Initiation of swallow delayed w/reduced laryngeal elevation. No coughing post swallow. Tended to talk w/food in mouth before swallowing.

## 2018-01-26 NOTE — DISCHARGE NOTE ADULT - CARE PLAN
Principal Discharge DX:	Pneumonia of right lower lobe due to infectious organism  Goal:	resolve infection  Assessment and plan of treatment:	you were treated for aspiration pneumonia and were evaluated by a speech and swallow therapist who recommended to continue with soft diet with thin liquids  we recommend to complete your antibiotic course at home with Ceftin 500mg 2 times a day for 5 more days  we also recommend to continue with steroid isaiah at home as indicated above  continue with inhalers as instructed above and recommend to follow up with primary care doctor in 1 week and will need pulmonary function test as outpatient  Secondary Diagnosis:	Essential hypertension  Assessment and plan of treatment:	continue with home medications as instructed above

## 2018-01-26 NOTE — PROGRESS NOTE ADULT - SUBJECTIVE AND OBJECTIVE BOX
Patient is a 82y old  Female who presents with a chief complaint of short of breath with cough (23 Jan 2018 14:05)    INTERVAL HPI / OVERNIGHT EVENTS:    ·	wheezing improved   ·	ambulated with physical therapy     T(C): 36.3 (01-26-18 @ 05:05), Max: 36.9 (01-25-18 @ 13:45)  HR: 110 (01-26-18 @ 10:56) (89 - 122)  BP: 101/56 (01-26-18 @ 10:56) (101/56 - 164/74)  RR: 16 (01-26-18 @ 05:05) (16 - 17)  SpO2: 95% (01-26-18 @ 10:56) (91% - 98%)  I&O's Summary      LABS:                        10.1   6.6   )-----------( 343      ( 26 Jan 2018 07:29 )             32.5     01-26    144  |  109<H>  |  23<H>  ----------------------------<  169<H>  3.9   |  29  |  0.56    Ca    8.7      26 Jan 2018 07:29  Phos  2.5     01-26  Mg     2.1     01-26    TPro  5.8<L>  /  Alb  2.3<L>  /  TBili  0.2  /  DBili  x   /  AST  8<L>  /  ALT  9<L>  /  AlkPhos  72  01-26    PHYSICAL EXAM:  GENERAL: NAD, well-groomed, well-developed  HEAD:  Atraumatic, Normocephalic  EYES: EOMI, PERRLA, conjunctiva and sclera clear  ENMT: No tonsillar erythema, exudates, or enlargement; Moist mucous membranes, Good dentition, No lesions  NECK: Supple, No JVD, Normal thyroid  NERVOUS SYSTEM:  Alert & Oriented X3, Good concentration; Motor Strength 5/5 B/L upper and lower extremities; DTRs 2+ intact and symmetric  CHEST/LUNG: right sided inspiratory wheezing   HEART: Regular rate and rhythm; No murmurs, rubs, or gallops  ABDOMEN: Soft, Nontender, Nondistended; Bowel sounds present  EXTREMITIES:  2+ Peripheral Pulses, No clubbing, cyanosis, or edema  SKIN: No rashes or lesions    Care Discussed with Consultants/Other Providers [x] YES  [ ] NO

## 2018-01-26 NOTE — SWALLOW BEDSIDE ASSESSMENT ADULT - COMMENTS
Pt awake & alert. Appeared confused/irritated. Per RN Jocelyn, pt speaks Farsi. Followed a couple of simple directions w/max cues. Verbal but did not respond to simple questions. Talked randomly. Counted 1-10 in English. Said "no" to orange juice. Vocal quality slightly hoarse at baseline. Tended to talk w/bolus in mouth. Tended to talk w/bolus in mouth. Refused after 3rd trial. Tended to talk w/bolus in mouth Oral residue post swallow but cleared w/sips of thin liquids. Tended to talk w/bolus in mouth. Oral residue post swallow but cleared w/sips of thin liquids. Took big bite. Tended to talk w/bolus in mouth. Refused after 1st trial. Insufficient trials to determine tolerance of regular consistency.

## 2018-01-26 NOTE — PROGRESS NOTE ADULT - ATTENDING COMMENTS
Patient was seen and examined at bedside, feeling ok, cough is better, but is still wheezing, which is relatively better then yesterday  Physical exam  Vitals  HEENT: Neck supple  Heart: S1 S2 normal  Abdomen: NT, ND  Chest: right Lower rhonchi appreciated, better, left sided wheeze relatively better.  LE: No LE edema    < from: Xray Chest 1 View AP -PORTABLE-Routine (01.24.18 @ 13:58) >    INTERPRETATION:  Follow-up.    AP chest. Prior 1/23/2018.    There is slight interval improvement in right basilar infiltrate. Patchy   infiltrate persists. Otherwise no change. Continued follow-up is in order.    Impression: As above    < end of copied text >    A/P  1- Asthma: continue with solumedrol to taper in am to prednisone  continue symbicort  continue advair.   Sepsis resolved.     2- CAP: continue Rocephin and Zithro    rest as above  Tentative discharge is tomorrow pending improvement

## 2018-01-26 NOTE — PHYSICAL THERAPY INITIAL EVALUATION ADULT - RANGE OF MOTION EXAMINATION, REHAB EVAL
except L shoulder flexion/abduction limited to 80 degrees secondary to pain, springy end feel/no ROM deficits were identified

## 2018-01-26 NOTE — DISCHARGE NOTE ADULT - PATIENT PORTAL LINK FT
“You can access the [facility] Patient Portal, offered by [facility], by registering with the following website: [email or web address]”

## 2018-01-26 NOTE — SWALLOW BEDSIDE ASSESSMENT ADULT - SWALLOW EVAL: RECOMMENDED FEEDING/EATING TECHNIQUES
Encounter Date: 1/31/2017       History     Chief Complaint   Patient presents with    Migraine     migraine headache and nausea since about 1700 yesterday. Denies vomiting     Review of patient's allergies indicates:   Allergen Reactions    Tegretol [carbamazepine] Anaphylaxis    Depakote [divalproex] Nausea And Vomiting     HPI Comments: Well appearing, non toxic, afebrile 32 year old female with c/o headache. Pt reports headache gradually increased to 10/10 starting yesterday around 1700 while walking home from school. Pt reports some associated nausea and photophobia. Reports headache as frontal, throbbing pressure, rated 10/10, with no relief from OTC Motrin at home. She denies any fever, chills, neck pain/stiffness, otalgia, sore throat, chest pain, cough, SOB, abdominal pain, vomiting, diarrhea, and urinary symptoms. She has a PMH of seizures as a child but has not had seizure like activity since 22 years old.    Patient is a 32 y.o. female presenting with the following complaint: headaches. The history is provided by the patient.   Headache    This is a new problem. The current episode started yesterday. The problem occurs constantly. The problem has been unchanged. The pain is located in the frontal region. The pain does not radiate. The quality of the pain is described as throbbing and pressure. The pain is at a severity of 10/10. Associated symptoms include nausea and photophobia. Pertinent negatives include no abdominal pain, abnormal behavior, anorexia, back pain, blurred vision, coughing, dizziness, drainage, ear pain, eye pain, eye redness, eye watering, facial sweating, fever, hearing loss, insomnia, loss of balance, muscle aches, neck pain, numbness, phonophobia, rhinorrhea, scalp tenderness, seizures, sinus pressure, sore throat, swollen glands, tingling, tinnitus, visual change, vomiting, weakness or weight loss. The symptoms are aggravated by bright light. She has tried NSAIDs for the  symptoms. The treatment provided no relief.     Past Medical History   Diagnosis Date    Seizures      epilepsy- states she has grown out of it     No past medical history pertinent negatives.  Past Surgical History   Procedure Laterality Date    Tubal ligation       section      Tonsillectomy       History reviewed. No pertinent family history.  Social History   Substance Use Topics    Smoking status: Former Smoker    Smokeless tobacco: Never Used    Alcohol use Yes      Comment: social     Review of Systems   Constitutional: Negative for chills, fatigue, fever and weight loss.   HENT: Negative for congestion, ear pain, hearing loss, rhinorrhea, sinus pressure, sore throat and tinnitus.    Eyes: Positive for photophobia. Negative for blurred vision, pain, redness and visual disturbance.   Respiratory: Negative for cough and shortness of breath.    Cardiovascular: Negative for chest pain.   Gastrointestinal: Positive for nausea. Negative for abdominal distention, abdominal pain, anorexia, constipation, diarrhea and vomiting.   Genitourinary: Negative for difficulty urinating and dysuria.   Musculoskeletal: Negative for arthralgias, back pain, gait problem, joint swelling, myalgias, neck pain and neck stiffness.   Skin: Negative for color change, pallor, rash and wound.   Neurological: Positive for headaches. Negative for dizziness, tingling, seizures, weakness, numbness and loss of balance.   Psychiatric/Behavioral: The patient does not have insomnia.        Physical Exam   Initial Vitals   BP Pulse Resp Temp SpO2   17 1152 17 1152 17 1152 17 1152 17 1152   128/70 81 20 98.4 °F (36.9 °C) 100 %     Physical Exam    Nursing note and vitals reviewed.  Constitutional: She appears well-developed and well-nourished. She is not diaphoretic.  Non-toxic appearance. She does not have a sickly appearance. She does not appear ill. No distress.   HENT:   Head: Normocephalic and  atraumatic.   Right Ear: Hearing, tympanic membrane, external ear and ear canal normal.   Left Ear: Hearing, tympanic membrane, external ear and ear canal normal.   Nose: Nose normal. No mucosal edema or rhinorrhea. No epistaxis.   Mouth/Throat: Uvula is midline, oropharynx is clear and moist and mucous membranes are normal. No oropharyngeal exudate, posterior oropharyngeal edema, posterior oropharyngeal erythema or tonsillar abscesses.   Eyes: Conjunctivae and EOM are normal. Pupils are equal, round, and reactive to light. Right eye exhibits no discharge. Left eye exhibits no discharge.   Neck: Normal range of motion and full passive range of motion without pain. Neck supple. No spinous process tenderness and no muscular tenderness present. No edema, no erythema and normal range of motion present. No rigidity.   Cardiovascular: Normal rate, regular rhythm and intact distal pulses.   Pulmonary/Chest: Effort normal and breath sounds normal. No accessory muscle usage. No apnea, no tachypnea and no bradypnea. No respiratory distress. She has no decreased breath sounds. She has no wheezes. She has no rhonchi. She has no rales. She exhibits no tenderness.   Abdominal: Soft. Bowel sounds are normal. She exhibits no distension. There is no tenderness. There is no rigidity, no rebound, no guarding, no CVA tenderness, no tenderness at McBurney's point and negative Palomares's sign.   Musculoskeletal: Normal range of motion. She exhibits no edema or tenderness.   Lymphadenopathy:     She has no cervical adenopathy.   Neurological: She is alert and oriented to person, place, and time. She has normal strength. Gait normal. GCS eye subscore is 4. GCS verbal subscore is 5. GCS motor subscore is 6.   5/5 bilateral upper and lower extremity strength, sensation intact.   Skin: Skin is warm and dry. No rash and no abscess noted. No erythema. No pallor.   Psychiatric: She has a normal mood and affect. Her behavior is normal. Judgment and  thought content normal.         ED Course   Procedures  Labs Reviewed   URINALYSIS             Medical Decision Making:   Initial Assessment:   Well appearing, non toxic, afebrile 32 year old female with c/o headache. Pt reports headache gradually increased to 10/10 starting yesterday around 1700 while walking home from school. Pt reports some associated nausea and photophobia. Reports headache as frontal, throbbing pressure, rated 10/10, with no relief from OTC Motrin at home. She denies any fever, chills, neck pain/stiffness, otalgia, sore throat, chest pain, cough, SOB, abdominal pain, vomiting, diarrhea, and urinary symptoms. She has a PMH of seizures as a child but has not had seizure like activity since 22 years old. Pt is AAO x3, EOMs intact, 5/5 bilateral upper and lower extremity strength, sensation intact. Neck supple, non tender, full ROM, no cervical adenopathy. Chest non tender, resp even and unlabored, breath sounds CTA, no tachypnea. Abdomen soft, non tender, non distended, BS active.  Differential Diagnosis:   Migraine HA, Headache, Tension Headache, Cluster Headache  Clinical Tests:   Lab Tests: Ordered and Reviewed  ED Management:  UA no significant findings, UPT negative. Treated in ED with Toradol, Phenergan, and Benadryl IM. Pt reports improvement in headache with treatments provided in ED. Pt symptoms related to migraine headache. Migraine gradual in onset, denies thunder-clap onset. Pt is neurovascularly intact, no vomiting, or blurred vision. Will treat symptomatically with Fioricet. Increase oral fluid intake. FU with PCP in 2-3 days as needed for any changes or worsening in condition and for further evaluation. Discussed strict return precautions. Pt in agreement with POC, verbalized understanding.              Attending Attestation:     Physician Attestation Statement for NP/PA:   I have conducted a face to face encounter with this patient in addition to the NP/PA, due to NP/PA  Request    Other NP/PA Attestation Additions:    History of Present Illness: 32-year-old female with a frontal headache since yesterday.  Pain was gradual in onset.  No neck pain or stiffness.  No fever or chills.  No nausea vomiting.   Physical Exam: No meningeal signs.  Cranial nerves intact.                  ED Course     Clinical Impression:   The encounter diagnosis was Migraine without status migrainosus, not intractable, unspecified migraine type.          Kayden Valdes NP  01/31/17 1408       Amadou Medina MD  01/31/17 140     check mouth frequently for oral residue/pocketing/crush medication (when feasible)/alternate food with liquid/position upright (90 degrees)/maintain upright posture during/after eating for 30 mins/no straws/allow for swallow between intakes/oral hygiene/small sips/bites

## 2018-01-26 NOTE — DISCHARGE NOTE ADULT - HOSPITAL COURSE
82 year old Farsi speaking female from home lives with son. PMH of HTN, non smoker and dementia. Pt presented with shortness of breath for 5 days. SOB was reported to be associated with cough and expectoration of yellowish sputum. Patient was recently admitted for fall and head injury and was in rehab. On admission pt was febrile with temperature of 101. Pt was admitted due to aspiration PNA with CXR showing RLL infiltrate pt was given Rocephin / Zithromax and Flagyl with improvement of symptoms, pt was also given IV steroids due to bilateral wheezing, progressively tapered down. Speech and swallow evaluation recommended mechanical soft diet and thin liquids with aspiration precautions.     Pt was seen by PT who recommended home with home PT. Pt currently stable and ready for discharge home. 82 year old Farsi speaking female from home lives with son. PMH of HTN, non smoker and dementia. Pt presented with shortness of breath for 5 days. SOB was reported to be associated with cough and expectoration of yellowish sputum. Patient was recently admitted for fall and head injury and was in rehab. On admission pt was febrile with temperature of 101. Pt was admitted due to aspiration PNA with CXR showing RLL infiltrate pt was given Rocephin / Zithromax and Flagyl with improvement of symptoms, pt was also given IV steroids due to bilateral wheezing, progressively tapered down. Speech and swallow evaluation recommended mechanical soft diet and thin liquids with aspiration precautions.     Pt was seen by PT who recommended home with home PT. Pt currently stable and ready for discharge home on cephalexin for 3 days and po steroid taper.

## 2018-01-26 NOTE — DISCHARGE NOTE ADULT - MEDICATION SUMMARY - MEDICATIONS TO TAKE
I will START or STAY ON the medications listed below when I get home from the hospital:    aspirin 81 mg oral tablet, chewable  -- 1 tab(s) by mouth once a day  -- Indication: For Prophylactic measure    HYDROXYUREA 500MG CAPSULE  -- Indication: For Psoriasis    METOPROLOL TARTRATE 25MG TABLET  -- 0.5 tab(s) by mouth 2 times a day  -- Indication: For Hypertension I will START or STAY ON the medications listed below when I get home from the hospital:    predniSONE 10 mg oral tablet  -- 4 tab(s) by mouth once a day for 1 day then,  3 tab(s) by mouth once a day for 3 days then,  2 tab(s) by mouth once a day for 3 days then,  1 tab(s) by mouth once a day for 3 days then stop  -- It is very important that you take or use this exactly as directed.  Do not skip doses or discontinue unless directed by your doctor.  Obtain medical advice before taking any non-prescription drugs as some may affect the action of this medication.  Take with food or milk.    -- Indication: For Pneumonia of right lower lobe due to infectious organism    aspirin 81 mg oral tablet, chewable  -- 1 tab(s) by mouth once a day  -- Indication: For Prophylactic measure    HYDROXYUREA 500MG CAPSULE  -- Indication: For Psoriasis    METOPROLOL TARTRATE 25MG TABLET  -- 0.5 tab(s) by mouth 2 times a day  -- Indication: For Hypertension    budesonide-formoterol 80 mcg-4.5 mcg/inh inhalation aerosol  -- 2 puff(s) inhaled 2 times a day   -- Indication: For Pneumonia of right lower lobe due to infectious organism    Advair HFA 45 mcg-21 mcg/inh inhalation aerosol  -- 2 puff(s) inhaled every 6 hours, As Needed  for shortness of breath / wheezing   -- Check with your doctor before becoming pregnant.  For inhalation only.  Rinse mouth thoroughly after use.  Shake well before use.    -- Indication: For Pneumonia of right lower lobe due to infectious organism    cephalexin 500 mg oral capsule  -- 1 cap(s) by mouth every 12 hours   -- Finish all this medication unless otherwise directed by prescriber.    -- Indication: For Pneumonia of right lower lobe due to infectious organism

## 2018-01-26 NOTE — PHYSICAL THERAPY INITIAL EVALUATION ADULT - CRITERIA FOR SKILLED THERAPEUTIC INTERVENTIONS
impairments found/predicted duration of therapy intervention/risk reduction/prevention/therapy frequency/rehab potential/functional limitations in following categories/anticipated equipment needs at discharge/anticipated discharge recommendation

## 2018-01-27 PROCEDURE — 99233 SBSQ HOSP IP/OBS HIGH 50: CPT | Mod: GC

## 2018-01-27 PROCEDURE — 76705 ECHO EXAM OF ABDOMEN: CPT | Mod: 26

## 2018-01-27 RX ADMIN — BUDESONIDE AND FORMOTEROL FUMARATE DIHYDRATE 2 PUFF(S): 160; 4.5 AEROSOL RESPIRATORY (INHALATION) at 21:26

## 2018-01-27 RX ADMIN — Medication 40 MILLIGRAM(S): at 17:13

## 2018-01-27 RX ADMIN — Medication 40 MILLIGRAM(S): at 06:21

## 2018-01-27 RX ADMIN — Medication 3 MILLILITER(S): at 14:12

## 2018-01-27 RX ADMIN — Medication 3 MILLILITER(S): at 09:09

## 2018-01-27 RX ADMIN — Medication 100 MILLIGRAM(S): at 21:26

## 2018-01-27 RX ADMIN — Medication 100 MILLIGRAM(S): at 13:27

## 2018-01-27 RX ADMIN — Medication 100 MILLIGRAM(S): at 06:22

## 2018-01-27 RX ADMIN — CEFTRIAXONE 100 GRAM(S): 500 INJECTION, POWDER, FOR SOLUTION INTRAMUSCULAR; INTRAVENOUS at 13:27

## 2018-01-27 RX ADMIN — BUDESONIDE AND FORMOTEROL FUMARATE DIHYDRATE 2 PUFF(S): 160; 4.5 AEROSOL RESPIRATORY (INHALATION) at 11:50

## 2018-01-27 RX ADMIN — Medication 81 MILLIGRAM(S): at 11:50

## 2018-01-27 RX ADMIN — ENOXAPARIN SODIUM 40 MILLIGRAM(S): 100 INJECTION SUBCUTANEOUS at 11:50

## 2018-01-27 RX ADMIN — Medication 3 MILLILITER(S): at 20:39

## 2018-01-27 RX ADMIN — AZITHROMYCIN 250 MILLIGRAM(S): 500 TABLET, FILM COATED ORAL at 17:13

## 2018-01-27 RX ADMIN — HYDROXYUREA 500 MILLIGRAM(S): 500 CAPSULE ORAL at 11:50

## 2018-01-27 NOTE — PROGRESS NOTE ADULT - ATTENDING COMMENTS
Agree with above   Patient is seen and examined at bedside. Spoke in patients native language. Reports that the cough and SOB improving, but c/o RUQ pain     ROS and PE as above   Vital Signs Last 24 Hrs  T(C): 36.5 (27 Jan 2018 13:49), Max: 36.9 (26 Jan 2018 21:30)  T(F): 97.7 (27 Jan 2018 13:49), Max: 98.4 (26 Jan 2018 21:30)  HR: 8 (27 Jan 2018 13:49) (8 - 105)  BP: 119/49 (27 Jan 2018 13:49) (119/49 - 147/71)  BP(mean): --  RR: 17 (27 Jan 2018 13:49) (16 - 17)  SpO2: 99% (27 Jan 2018 13:49) (95% - 99%)      1. Sepsis secondary to PNA -sepsis resolved. Feeling better   Afebrile, no leukocytosis   On Ceftriaxone, Azithromycin and Flagyl day 5   Will taper solumedrol to prednisone 40     2. HTN: BP controlled    3. RUQ pain likely musculoskeletal, but will get an US abdomen r/o cholelithiases    Patient is medically stable to be discharged, but says that nobody to pick her up today. Plan for d/c in AM

## 2018-01-27 NOTE — PROGRESS NOTE ADULT - SUBJECTIVE AND OBJECTIVE BOX
Patient is a 82y old  Female who presents with a chief complaint of short of breath with cough (26 Jan 2018 17:04)    INTERVAL HPI / OVERNIGHT EVENTS:    ·	complains of RUQ pain   ·	mild bilateral inspiratory wheezing     T(C): 36.6 (01-27-18 @ 05:38), Max: 36.9 (01-26-18 @ 21:30)  HR: 95 (01-27-18 @ 05:38) (95 - 110)  BP: 147/71 (01-27-18 @ 05:38) (101/56 - 147/71)  RR: 16 (01-27-18 @ 05:38) (16 - 19)  SpO2: 95% (01-27-18 @ 05:38) (94% - 97%)  I&O's Summary      LABS:                        10.1   6.6   )-----------( 343      ( 26 Jan 2018 07:29 )             32.5     01-26    144  |  109<H>  |  23<H>  ----------------------------<  169<H>  3.9   |  29  |  0.56    Ca    8.7      26 Jan 2018 07:29  Phos  2.5     01-26  Mg     2.1     01-26    TPro  5.8<L>  /  Alb  2.3<L>  /  TBili  0.2  /  DBili  x   /  AST  8<L>  /  ALT  9<L>  /  AlkPhos  72  01-26      PHYSICAL EXAM:  GENERAL: NAD, well-groomed, well-developed  HEAD:  Atraumatic, Normocephalic  EYES: EOMI, PERRLA, conjunctiva and sclera clear  ENMT: No tonsillar erythema, exudates, or enlargement; Moist mucous membranes, Good dentition, No lesions  NECK: Supple, No JVD, Normal thyroid  NERVOUS SYSTEM:  Alert & Oriented X3, Good concentration; Motor Strength 5/5 B/L upper and lower extremities; DTRs 2+ intact and symmetric  CHEST/LUNG: bilateral inspiratory wheezing   HEART: Regular rate and rhythm; No murmurs, rubs, or gallops  ABDOMEN: Soft, Nontender, Nondistended; Bowel sounds present  EXTREMITIES:  2+ Peripheral Pulses, No clubbing, cyanosis, or edema  SKIN: No rashes or lesions    Care Discussed with Consultants/Other Providers [x] YES  [ ] NO

## 2018-01-28 LAB
CULTURE RESULTS: SIGNIFICANT CHANGE UP
CULTURE RESULTS: SIGNIFICANT CHANGE UP
SPECIMEN SOURCE: SIGNIFICANT CHANGE UP
SPECIMEN SOURCE: SIGNIFICANT CHANGE UP

## 2018-01-28 PROCEDURE — 99233 SBSQ HOSP IP/OBS HIGH 50: CPT | Mod: GC

## 2018-01-28 RX ADMIN — CEFTRIAXONE 100 GRAM(S): 500 INJECTION, POWDER, FOR SOLUTION INTRAMUSCULAR; INTRAVENOUS at 13:43

## 2018-01-28 RX ADMIN — ENOXAPARIN SODIUM 40 MILLIGRAM(S): 100 INJECTION SUBCUTANEOUS at 11:38

## 2018-01-28 RX ADMIN — BUDESONIDE AND FORMOTEROL FUMARATE DIHYDRATE 2 PUFF(S): 160; 4.5 AEROSOL RESPIRATORY (INHALATION) at 21:44

## 2018-01-28 RX ADMIN — Medication 3 MILLILITER(S): at 15:17

## 2018-01-28 RX ADMIN — Medication 100 MILLIGRAM(S): at 13:43

## 2018-01-28 RX ADMIN — Medication 40 MILLIGRAM(S): at 06:13

## 2018-01-28 RX ADMIN — Medication 81 MILLIGRAM(S): at 11:38

## 2018-01-28 RX ADMIN — AZITHROMYCIN 250 MILLIGRAM(S): 500 TABLET, FILM COATED ORAL at 17:29

## 2018-01-28 RX ADMIN — BUDESONIDE AND FORMOTEROL FUMARATE DIHYDRATE 2 PUFF(S): 160; 4.5 AEROSOL RESPIRATORY (INHALATION) at 11:38

## 2018-01-28 RX ADMIN — Medication 100 MILLIGRAM(S): at 06:13

## 2018-01-28 RX ADMIN — Medication 100 MILLIGRAM(S): at 21:44

## 2018-01-28 RX ADMIN — Medication 3 MILLILITER(S): at 09:43

## 2018-01-28 RX ADMIN — HYDROXYUREA 500 MILLIGRAM(S): 500 CAPSULE ORAL at 11:38

## 2018-01-29 VITALS
TEMPERATURE: 98 F | DIASTOLIC BLOOD PRESSURE: 68 MMHG | OXYGEN SATURATION: 98 % | HEART RATE: 78 BPM | SYSTOLIC BLOOD PRESSURE: 112 MMHG | RESPIRATION RATE: 18 BRPM

## 2018-01-29 DIAGNOSIS — J45.901 UNSPECIFIED ASTHMA WITH (ACUTE) EXACERBATION: ICD-10-CM

## 2018-01-29 LAB
ALBUMIN SERPL ELPH-MCNC: 2.4 G/DL — LOW (ref 3.5–5)
ALP SERPL-CCNC: 58 U/L — SIGNIFICANT CHANGE UP (ref 40–120)
ALT FLD-CCNC: 20 U/L DA — SIGNIFICANT CHANGE UP (ref 10–60)
ANION GAP SERPL CALC-SCNC: 5 MMOL/L — SIGNIFICANT CHANGE UP (ref 5–17)
AST SERPL-CCNC: 30 U/L — SIGNIFICANT CHANGE UP (ref 10–40)
BILIRUB SERPL-MCNC: 0.3 MG/DL — SIGNIFICANT CHANGE UP (ref 0.2–1.2)
BUN SERPL-MCNC: 15 MG/DL — SIGNIFICANT CHANGE UP (ref 7–18)
CALCIUM SERPL-MCNC: 8.6 MG/DL — SIGNIFICANT CHANGE UP (ref 8.4–10.5)
CHLORIDE SERPL-SCNC: 101 MMOL/L — SIGNIFICANT CHANGE UP (ref 96–108)
CO2 SERPL-SCNC: 34 MMOL/L — HIGH (ref 22–31)
CREAT SERPL-MCNC: 0.53 MG/DL — SIGNIFICANT CHANGE UP (ref 0.5–1.3)
GLUCOSE SERPL-MCNC: 120 MG/DL — HIGH (ref 70–99)
MAGNESIUM SERPL-MCNC: 2 MG/DL — SIGNIFICANT CHANGE UP (ref 1.6–2.6)
PHOSPHATE SERPL-MCNC: 2.6 MG/DL — SIGNIFICANT CHANGE UP (ref 2.5–4.5)
POTASSIUM SERPL-MCNC: 4.2 MMOL/L — SIGNIFICANT CHANGE UP (ref 3.5–5.3)
POTASSIUM SERPL-SCNC: 4.2 MMOL/L — SIGNIFICANT CHANGE UP (ref 3.5–5.3)
PROT SERPL-MCNC: 5.9 G/DL — LOW (ref 6–8.3)
SODIUM SERPL-SCNC: 140 MMOL/L — SIGNIFICANT CHANGE UP (ref 135–145)

## 2018-01-29 PROCEDURE — 82803 BLOOD GASES ANY COMBINATION: CPT

## 2018-01-29 PROCEDURE — 92610 EVALUATE SWALLOWING FUNCTION: CPT

## 2018-01-29 PROCEDURE — 82550 ASSAY OF CK (CPK): CPT

## 2018-01-29 PROCEDURE — 82553 CREATINE MB FRACTION: CPT

## 2018-01-29 PROCEDURE — 85379 FIBRIN DEGRADATION QUANT: CPT

## 2018-01-29 PROCEDURE — 99285 EMERGENCY DEPT VISIT HI MDM: CPT | Mod: 25

## 2018-01-29 PROCEDURE — 87486 CHLMYD PNEUM DNA AMP PROBE: CPT

## 2018-01-29 PROCEDURE — 85730 THROMBOPLASTIN TIME PARTIAL: CPT

## 2018-01-29 PROCEDURE — 87040 BLOOD CULTURE FOR BACTERIA: CPT

## 2018-01-29 PROCEDURE — 76705 ECHO EXAM OF ABDOMEN: CPT

## 2018-01-29 PROCEDURE — 80053 COMPREHEN METABOLIC PANEL: CPT

## 2018-01-29 PROCEDURE — 83605 ASSAY OF LACTIC ACID: CPT

## 2018-01-29 PROCEDURE — 87581 M.PNEUMON DNA AMP PROBE: CPT

## 2018-01-29 PROCEDURE — 84100 ASSAY OF PHOSPHORUS: CPT

## 2018-01-29 PROCEDURE — 83036 HEMOGLOBIN GLYCOSYLATED A1C: CPT

## 2018-01-29 PROCEDURE — 93005 ELECTROCARDIOGRAM TRACING: CPT

## 2018-01-29 PROCEDURE — 84484 ASSAY OF TROPONIN QUANT: CPT

## 2018-01-29 PROCEDURE — 87798 DETECT AGENT NOS DNA AMP: CPT

## 2018-01-29 PROCEDURE — 80061 LIPID PANEL: CPT

## 2018-01-29 PROCEDURE — 81001 URINALYSIS AUTO W/SCOPE: CPT

## 2018-01-29 PROCEDURE — 94640 AIRWAY INHALATION TREATMENT: CPT

## 2018-01-29 PROCEDURE — 71045 X-RAY EXAM CHEST 1 VIEW: CPT

## 2018-01-29 PROCEDURE — 83735 ASSAY OF MAGNESIUM: CPT

## 2018-01-29 PROCEDURE — 82306 VITAMIN D 25 HYDROXY: CPT

## 2018-01-29 PROCEDURE — 87633 RESP VIRUS 12-25 TARGETS: CPT

## 2018-01-29 PROCEDURE — 97162 PT EVAL MOD COMPLEX 30 MIN: CPT

## 2018-01-29 PROCEDURE — 84443 ASSAY THYROID STIM HORMONE: CPT

## 2018-01-29 PROCEDURE — 85610 PROTHROMBIN TIME: CPT

## 2018-01-29 PROCEDURE — 80048 BASIC METABOLIC PNL TOTAL CA: CPT

## 2018-01-29 PROCEDURE — 85027 COMPLETE CBC AUTOMATED: CPT

## 2018-01-29 PROCEDURE — 99239 HOSP IP/OBS DSCHRG MGMT >30: CPT

## 2018-01-29 PROCEDURE — 71045 X-RAY EXAM CHEST 1 VIEW: CPT | Mod: 26

## 2018-01-29 RX ORDER — BUDESONIDE AND FORMOTEROL FUMARATE DIHYDRATE 160; 4.5 UG/1; UG/1
2 AEROSOL RESPIRATORY (INHALATION)
Qty: 2 | Refills: 0 | OUTPATIENT
Start: 2018-01-29 | End: 2018-02-27

## 2018-01-29 RX ORDER — CEPHALEXIN 500 MG
1 CAPSULE ORAL
Qty: 6 | Refills: 0 | OUTPATIENT
Start: 2018-01-29 | End: 2018-01-31

## 2018-01-29 RX ORDER — FLUTICASONE PROPIONATE AND SALMETEROL 50; 250 UG/1; UG/1
2 POWDER ORAL; RESPIRATORY (INHALATION)
Qty: 2 | Refills: 0 | OUTPATIENT
Start: 2018-01-29 | End: 2018-02-27

## 2018-01-29 RX ADMIN — Medication 40 MILLIGRAM(S): at 05:13

## 2018-01-29 RX ADMIN — Medication 3 MILLILITER(S): at 08:08

## 2018-01-29 RX ADMIN — Medication 81 MILLIGRAM(S): at 14:11

## 2018-01-29 RX ADMIN — CEFTRIAXONE 100 GRAM(S): 500 INJECTION, POWDER, FOR SOLUTION INTRAMUSCULAR; INTRAVENOUS at 14:10

## 2018-01-29 RX ADMIN — HYDROXYUREA 500 MILLIGRAM(S): 500 CAPSULE ORAL at 14:11

## 2018-01-29 RX ADMIN — Medication 100 MILLIGRAM(S): at 05:14

## 2018-01-29 RX ADMIN — Medication 100 MILLIGRAM(S): at 14:10

## 2018-01-29 RX ADMIN — ENOXAPARIN SODIUM 40 MILLIGRAM(S): 100 INJECTION SUBCUTANEOUS at 14:10

## 2018-01-29 NOTE — PROGRESS NOTE ADULT - PROBLEM SELECTOR PROBLEM 4
Need for prophylactic measure
Hypertension
Need for prophylactic measure

## 2018-01-29 NOTE — PROGRESS NOTE ADULT - PROBLEM SELECTOR PLAN 3
Severe exacerbation induced by the PNA and infection.  Today patient is wheeze free,   to continue on prednisone taper after discharge and f.u with outpatient.
holding metoprolol while patient is in sepsis  -restart upon discharge or sepsis resolved  -bp monitor

## 2018-01-29 NOTE — PROGRESS NOTE ADULT - PROBLEM SELECTOR PLAN 2
-Rocephin  and Flagyl   -Duoneb, Tylenol and Robitussin as mentioned above  - added IV solumedrol as pt with bilateral wheezing   -f/u sputum and blood culture
-Rocephin, Zithromax and Flagyl   c/w Duoneb, Tylenol and Robitussin as mentioned above  c/w IV solumedrol q/12hrs   -f/u sputum culture (if pt able to provide)
-Rocephin, Zithromax and Flagyl   c/w Duoneb, Tylenol and Robitussin as mentioned above  c/w IV solumedrol tapered down   -f/u sputum culture (if pt able to provide)
will discharge patient on Ceftin to complete 10 days course.   Dc azithromycin.
-Rocephin, Zithromax and Flagyl   c/w Duoneb, Tylenol and Robitussin as mentioned above  c/w IV solumedrol as pt with bilateral wheezing   -f/u sputum culture (if pt able to provide)

## 2018-01-29 NOTE — PROGRESS NOTE ADULT - PROBLEM SELECTOR PROBLEM 3
Hypertension
Severe asthma with exacerbation, unspecified whether persistent
Hypertension

## 2018-01-29 NOTE — PROGRESS NOTE ADULT - PROBLEM SELECTOR PLAN 1
-met SIRS criteria (tachycardia and rapid RR)  -likely 2/2 aspiration PNA (CXR showing RLL infiltrate)  -c/w Rocephin and added Flagyl (for anaerobic coverage concern for possible aspiration)  -c/w IV hydration for another day  -c/w Duoneb, Robitussin and tylenols  -f/u blood culture and sputum culture
secondary to aspiration PNA (CXR showing RLL infiltrate)  added Symbicort   c/w Rocephin, Zithromax and Flagyl (for anaerobic coverage concern for possible aspiration)  c/w Duoneb, Robitussin and tylenols  blood culture negative
secondary to aspiration PNA (CXR showing RLL infiltrate)  added Symbicort   c/w Rocephin, Zithromax and Flagyl (for anaerobic coverage concern for possible aspiration)  c/w Duoneb, Robitussin and tylenols  blood culture negative
secondary to pneumonia.   now feels better  CXR showing resolving pneumonia.
secondary to aspiration PNA (CXR showing RLL infiltrate)  added Symbicort   c/w Rocephin, Zithromax and Flagyl (for anaerobic coverage concern for possible aspiration)  c/w Duoneb, Robitussin and tylenols  blood culture negative

## 2018-01-29 NOTE — PROGRESS NOTE ADULT - ASSESSMENT
82 year old female with no significant PMH non smoker presented with short of breath for 5 days.

## 2018-01-29 NOTE — PROGRESS NOTE ADULT - PROBLEM SELECTOR PLAN 4
Improve score 2. Age and immobility  Will give Lovenox for DVT PPx.
continue home meds.

## 2018-01-29 NOTE — PROGRESS NOTE ADULT - SUBJECTIVE AND OBJECTIVE BOX
Patient is a 82y old  Female who presents with a chief complaint of short of breath with cough (26 Jan 2018 17:04)      INTERVAL HPI/OVERNIGHT EVENTS:    Allergies    No Known Allergies    Intolerances        REVIEW OF SYSTEMS:  CONSTITUTIONAL: No fever, weight loss, or fatigue  EYES: No eye pain, visual disturbances, or discharge  RESPIRATORY: No cough, wheezing or shortness of breath  CARDIOVASCULAR: No chest pain, feeling of heart beats  GASTROINTESTINAL: No abdominal or epigastric pain. No nausea, vomiting; No diarrhea or constipation.  GENITOURINARY: No dysuria, frequency, hematuria  NEUROLOGICAL: No headaches  MUSCULOSKELETAL: No joint pain  PSYCHIATRIC: No depression or anxiety  HEME/LYMPH: No easy bruising, or bleeding gums  ALLERGY AND IMMUNOLOGIC: No hives or eczema    Medications:  acetaminophen   Tablet 650 milliGRAM(s) Oral every 6 hours PRN For Temp greater than 38 C (100.4 F)  ALBUTerol/ipratropium for Nebulization 3 milliLiter(s) Nebulizer every 6 hours  aspirin  chewable 81 milliGRAM(s) Oral daily  azithromycin  IVPB 500 milliGRAM(s) IV Intermittent every 24 hours  azithromycin  IVPB      buDESOnide  80 MICROgram(s)/formoterol 4.5 MICROgram(s) Inhaler 2 Puff(s) Inhalation two times a day  cefTRIAXone   IVPB      cefTRIAXone   IVPB 1 Gram(s) IV Intermittent every 24 hours  enoxaparin Injectable 40 milliGRAM(s) SubCutaneous daily  guaiFENesin    Syrup 100 milliGRAM(s) Oral every 6 hours PRN Cough  HYDROcodone/homatropine Syrup 5 milliLiter(s) Oral two times a day PRN Cough  metroNIDAZOLE  IVPB 500 milliGRAM(s) IV Intermittent every 8 hours  metroNIDAZOLE  IVPB      predniSONE   Tablet 40 milliGRAM(s) Oral daily  sodium chloride 0.9%. 1000 milliLiter(s) IV Continuous <Continuous>      PHYSICAL EXAM:  Vital Signs Last 24 Hrs  T(C): 36.6 (29 Jan 2018 14:05), Max: 36.8 (28 Jan 2018 15:03)  T(F): 97.9 (29 Jan 2018 14:05), Max: 98.2 (28 Jan 2018 15:03)  HR: 78 (29 Jan 2018 14:05) (78 - 82)  BP: 112/65 (29 Jan 2018 14:05) (109/50 - 148/80)  BP(mean): --  RR: 17 (29 Jan 2018 14:05) (16 - 17)  SpO2: 97% (29 Jan 2018 14:05) (97% - 99%)    GENERAL: NAD  HEAD:  Atraumatic, Normocephalic  EYES: EOMI, PERRLA, conjunctiva and sclera clear  ENT: moist mucous membranes  NECK: Supple, No JVD, Normal thyroid  NERVOUS SYSTEM:  Alert & Oriented X3, Good concentration; Motor Strength 5/5 B/L upper and lower extremities; DTRs 2+ intact and symmetric  CHEST/LUNG: No rales, rhonchi, wheezing, or rubs  HEART: Regular rate and rhythm; No murmurs, rubs, or gallops  ABDOMEN: Soft, Nontender, Nondistended; Bowel sounds present  EXTREMITIES:  2+ Peripheral Pulses, No clubbing, cyanosis, or edema  SKIN: No rashes or lesions    LABS:    01-29    140  |  101  |  15  ----------------------------<  120<H>  4.2   |  34<H>  |  0.53    Ca    8.6      29 Jan 2018 08:58  Phos  2.6     01-29  Mg     2.0     01-29    TPro  5.9<L>  /  Alb  2.4<L>  /  TBili  0.3  /  DBili  x   /  AST  30  /  ALT  20  /  AlkPhos  58  01-29    LIVER FUNCTIONS - ( 29 Jan 2018 08:58 )  Alb: 2.4 g/dL / Pro: 5.9 g/dL / ALK PHOS: 58 U/L / ALT: 20 U/L DA / AST: 30 U/L / GGT: x                     Culture & Sensitivity:   CAPILLARY BLOOD GLUCOSE            RADIOLOGY & ADDITIONAL TESTS:  Radiology testing independently reviewed:     Consultant(s) Notes Reviewed:  [ x] YES  [ ] NO    Care Discussed with Consultants/Other Providers [ x] YES  [ ] NO Patient is a 82y old  Female who presents with a chief complaint of short of breath with cough (26 Jan 2018 17:04)    INTERVAL HPI/OVERNIGHT EVENTS:  Patient seen and examined at bedside, feeling ok.     Allergies    No Known Allergies    Intolerances    REVIEW OF SYSTEMS:  CONSTITUTIONAL: No fever, weight loss, or fatigue  EYES: No eye pain, visual disturbances, or discharge  RESPIRATORY: No cough, wheezing or shortness of breath  CARDIOVASCULAR: No chest pain, feeling of heart beats  GASTROINTESTINAL: No abdominal or epigastric pain. No nausea, vomiting; No diarrhea or constipation.  GENITOURINARY: No dysuria, frequency, hematuria  NEUROLOGICAL: No headaches  MUSCULOSKELETAL: No joint pain  PSYCHIATRIC: No depression or anxiety  HEME/LYMPH: No easy bruising, or bleeding gums  ALLERGY AND IMMUNOLOGIC: No hives or eczema    Medications:  acetaminophen   Tablet 650 milliGRAM(s) Oral every 6 hours PRN For Temp greater than 38 C (100.4 F)  ALBUTerol/ipratropium for Nebulization 3 milliLiter(s) Nebulizer every 6 hours  aspirin  chewable 81 milliGRAM(s) Oral daily  azithromycin  IVPB 500 milliGRAM(s) IV Intermittent every 24 hours  azithromycin  IVPB      buDESOnide  80 MICROgram(s)/formoterol 4.5 MICROgram(s) Inhaler 2 Puff(s) Inhalation two times a day  cefTRIAXone   IVPB      cefTRIAXone   IVPB 1 Gram(s) IV Intermittent every 24 hours  enoxaparin Injectable 40 milliGRAM(s) SubCutaneous daily  guaiFENesin    Syrup 100 milliGRAM(s) Oral every 6 hours PRN Cough  HYDROcodone/homatropine Syrup 5 milliLiter(s) Oral two times a day PRN Cough  metroNIDAZOLE  IVPB 500 milliGRAM(s) IV Intermittent every 8 hours  metroNIDAZOLE  IVPB      predniSONE   Tablet 40 milliGRAM(s) Oral daily  sodium chloride 0.9%. 1000 milliLiter(s) IV Continuous <Continuous>      PHYSICAL EXAM:  Vital Signs Last 24 Hrs  T(C): 36.6 (29 Jan 2018 14:05), Max: 36.8 (28 Jan 2018 15:03)  T(F): 97.9 (29 Jan 2018 14:05), Max: 98.2 (28 Jan 2018 15:03)  HR: 78 (29 Jan 2018 14:05) (78 - 82)  BP: 112/65 (29 Jan 2018 14:05) (109/50 - 148/80)  BP(mean): --  RR: 17 (29 Jan 2018 14:05) (16 - 17)  SpO2: 97% (29 Jan 2018 14:05) (97% - 99%)    GENERAL: NAD  HEAD:  Atraumatic, Normocephalic  EYES: EOMI, PERRLA, conjunctiva and sclera clear  ENT: moist mucous membranes  NECK: Supple, No JVD, Normal thyroid  NERVOUS SYSTEM:  Alert & Oriented X3, Good concentration;   CHEST/LUNG: Clear, no wheeze could be appreciated today.   HEART: Regular rate and rhythm; No murmurs, rubs, or gallops  ABDOMEN: Soft, Nontender, Nondistended; Bowel sounds present  EXTREMITIES:  2+ Peripheral Pulses, No clubbing, cyanosis, or edema  SKIN: No rashes or lesions    LABS:    01-29    140  |  101  |  15  ----------------------------<  120<H>  4.2   |  34<H>  |  0.53    Ca    8.6      29 Jan 2018 08:58  Phos  2.6     01-29  Mg     2.0     01-29    TPro  5.9<L>  /  Alb  2.4<L>  /  TBili  0.3  /  DBili  x   /  AST  30  /  ALT  20  /  AlkPhos  58  01-29    LIVER FUNCTIONS - ( 29 Jan 2018 08:58 )  Alb: 2.4 g/dL / Pro: 5.9 g/dL / ALK PHOS: 58 U/L / ALT: 20 U/L DA / AST: 30 U/L / GGT: x                     Culture & Sensitivity:   CAPILLARY BLOOD GLUCOSE            RADIOLOGY & ADDITIONAL TESTS:  Radiology testing independently reviewed:   < from: Xray Chest 1 View AP- PORTABLE-Urgent (01.29.18 @ 11:50) >  INTERPRETATION:  AP semisupine chest on January 29, 2018 at 10:33 AM.    Patient is short of breath.    Heart is magnified by technique. Densely calcified mitral area and left   upper left humeral hardware again noted.    On January 24 there was a small right base infiltrate. Present film shows   improvement with very minimal residual.    IMPRESSION: Increasing improvement right base infiltrate.    < end of copied text >    Consultant(s) Notes Reviewed:  [ x] YES  [ ] NO    Care Discussed with Consultants/Other Providers [ x] YES  [ ] NO

## 2020-01-09 NOTE — ED ADULT TRIAGE NOTE - NS ED NOTE AC HIGH RISK COUNTRIES
1  Have repeat MRI of the brain performed  2  Start Zoloft 50 mg tablets, 1 tablet every morning  3  See our headache specialist on 1/13/2020   4  Have repeat sleep study done  4  Follow up with me in 2 months and call with any questions or concerns  No

## 2020-04-20 NOTE — PROGRESS NOTE ADULT - PROBLEM SELECTOR PROBLEM 2
[Time Spent: ___ minutes] : I have spent [unfilled] minutes of face to face time with the patient
Pneumonia of right lower lobe due to infectious organism